# Patient Record
Sex: FEMALE | Race: WHITE | Employment: FULL TIME | URBAN - METROPOLITAN AREA
[De-identification: names, ages, dates, MRNs, and addresses within clinical notes are randomized per-mention and may not be internally consistent; named-entity substitution may affect disease eponyms.]

---

## 2019-06-04 ENCOUNTER — OFFICE VISIT (OUTPATIENT)
Dept: FAMILY MEDICINE CLINIC | Age: 34
End: 2019-06-04

## 2019-06-04 ENCOUNTER — HOSPITAL ENCOUNTER (OUTPATIENT)
Dept: LAB | Age: 34
Discharge: HOME | End: 2019-06-04
Payer: COMMERCIAL

## 2019-06-04 VITALS
RESPIRATION RATE: 16 BRPM | HEART RATE: 88 BPM | DIASTOLIC BLOOD PRESSURE: 65 MMHG | BODY MASS INDEX: 44.41 KG/M2 | HEIGHT: 68 IN | OXYGEN SATURATION: 97 % | SYSTOLIC BLOOD PRESSURE: 106 MMHG | WEIGHT: 293 LBS | TEMPERATURE: 98.4 F

## 2019-06-04 DIAGNOSIS — M72.2 PLANTAR FASCIITIS OF RIGHT FOOT: Primary | ICD-10-CM

## 2019-06-04 DIAGNOSIS — E66.01 MORBID OBESITY (HCC): ICD-10-CM

## 2019-06-04 LAB
ALBUMIN SERPL-MCNC: 4.3 G/DL (ref 3.4–5)
ALBUMIN/GLOB SERPL: 1.4 {RATIO} (ref 0.8–1.7)
ALP SERPL-CCNC: 74 U/L (ref 45–117)
ALT SERPL-CCNC: 38 U/L (ref 13–56)
ANION GAP SERPL CALC-SCNC: 7 MMOL/L (ref 3–18)
APPEARANCE UR: CLEAR
AST SERPL-CCNC: 19 U/L (ref 15–37)
BACTERIA URNS QL MICRO: ABNORMAL /HPF
BASOPHILS # BLD: 0 K/UL (ref 0–0.1)
BASOPHILS NFR BLD: 0 % (ref 0–2)
BILIRUB SERPL-MCNC: 0.5 MG/DL (ref 0.2–1)
BILIRUB UR QL: NEGATIVE
BUN SERPL-MCNC: 11 MG/DL (ref 7–18)
BUN/CREAT SERPL: 14 (ref 12–20)
CALCIUM SERPL-MCNC: 9.1 MG/DL (ref 8.5–10.1)
CHLORIDE SERPL-SCNC: 102 MMOL/L (ref 100–108)
CHOLEST SERPL-MCNC: 165 MG/DL
CO2 SERPL-SCNC: 28 MMOL/L (ref 21–32)
COLOR UR: YELLOW
CREAT SERPL-MCNC: 0.76 MG/DL (ref 0.6–1.3)
DIFFERENTIAL METHOD BLD: NORMAL
EOSINOPHIL # BLD: 0.1 K/UL (ref 0–0.4)
EOSINOPHIL NFR BLD: 1 % (ref 0–5)
EPITH CASTS URNS QL MICRO: ABNORMAL /LPF (ref 0–5)
ERYTHROCYTE [DISTWIDTH] IN BLOOD BY AUTOMATED COUNT: 12.8 % (ref 11.6–14.5)
EST. AVERAGE GLUCOSE BLD GHB EST-MCNC: 120 MG/DL
GLOBULIN SER CALC-MCNC: 3.1 G/DL (ref 2–4)
GLUCOSE SERPL-MCNC: 86 MG/DL (ref 74–99)
GLUCOSE UR STRIP.AUTO-MCNC: NEGATIVE MG/DL
HBA1C MFR BLD: 5.8 % (ref 4.2–5.6)
HCT VFR BLD AUTO: 39.3 % (ref 35–45)
HDLC SERPL-MCNC: 46 MG/DL (ref 40–60)
HDLC SERPL: 3.6 {RATIO} (ref 0–5)
HGB BLD-MCNC: 12.9 G/DL (ref 12–16)
HGB UR QL STRIP: NEGATIVE
KETONES UR QL STRIP.AUTO: NEGATIVE MG/DL
LDLC SERPL CALC-MCNC: 85.2 MG/DL (ref 0–100)
LEUKOCYTE ESTERASE UR QL STRIP.AUTO: ABNORMAL
LIPID PROFILE,FLP: ABNORMAL
LYMPHOCYTES # BLD: 1.9 K/UL (ref 0.9–3.6)
LYMPHOCYTES NFR BLD: 28 % (ref 21–52)
MCH RBC QN AUTO: 30.2 PG (ref 24–34)
MCHC RBC AUTO-ENTMCNC: 32.8 G/DL (ref 31–37)
MCV RBC AUTO: 92 FL (ref 74–97)
MONOCYTES # BLD: 0.5 K/UL (ref 0.05–1.2)
MONOCYTES NFR BLD: 7 % (ref 3–10)
NEUTS SEG # BLD: 4.4 K/UL (ref 1.8–8)
NEUTS SEG NFR BLD: 64 % (ref 40–73)
NITRITE UR QL STRIP.AUTO: NEGATIVE
PH UR STRIP: 7 [PH] (ref 5–8)
PLATELET # BLD AUTO: 235 K/UL (ref 135–420)
PMV BLD AUTO: 10.8 FL (ref 9.2–11.8)
POTASSIUM SERPL-SCNC: 4 MMOL/L (ref 3.5–5.5)
PROT SERPL-MCNC: 7.4 G/DL (ref 6.4–8.2)
PROT UR STRIP-MCNC: NEGATIVE MG/DL
RBC # BLD AUTO: 4.27 M/UL (ref 4.2–5.3)
RBC #/AREA URNS HPF: 0 /HPF (ref 0–5)
SODIUM SERPL-SCNC: 137 MMOL/L (ref 136–145)
SP GR UR REFRACTOMETRY: 1.01 (ref 1–1.03)
TRIGL SERPL-MCNC: 169 MG/DL (ref ?–150)
TSH SERPL DL<=0.05 MIU/L-ACNC: 1.76 UIU/ML (ref 0.36–3.74)
UROBILINOGEN UR QL STRIP.AUTO: 0.2 EU/DL (ref 0.2–1)
VLDLC SERPL CALC-MCNC: 33.8 MG/DL
WBC # BLD AUTO: 6.9 K/UL (ref 4.6–13.2)
WBC URNS QL MICRO: ABNORMAL /HPF (ref 0–4)

## 2019-06-04 PROCEDURE — 84443 ASSAY THYROID STIM HORMONE: CPT

## 2019-06-04 PROCEDURE — 81001 URINALYSIS AUTO W/SCOPE: CPT

## 2019-06-04 PROCEDURE — 80061 LIPID PANEL: CPT

## 2019-06-04 PROCEDURE — 36415 COLL VENOUS BLD VENIPUNCTURE: CPT

## 2019-06-04 PROCEDURE — 85025 COMPLETE CBC W/AUTO DIFF WBC: CPT

## 2019-06-04 PROCEDURE — 83036 HEMOGLOBIN GLYCOSYLATED A1C: CPT

## 2019-06-04 PROCEDURE — 80053 COMPREHEN METABOLIC PANEL: CPT

## 2019-06-04 NOTE — PROGRESS NOTES
History of Present Illness  Bob Rock is a 29 y.o. female who presents today for management of    Chief Complaint   Patient presents with   [facility]    Foot Pain     right       Patient is here to establish care. Previous PCP: Dr. Jese Millan pain  Patient complains of left foot pain, localized to the heel. Onset of the symptoms was 3 months ago. Inciting event: none known. Current symptoms include ability to bear weight, but with some pain. Aggravating symptoms: first step in the morning. Patient's course of pain: gradually improving. Patient has had no prior foot problems. Previous visits for this problem: none. Evaluation to date: none. Treatment to date: OTC analgesics: somewhat effective. She and your  have been trying to get pregnant for one year. Past Medical History  Past Medical History:   Diagnosis Date    Obesity (BMI 35.0-39.9 without comorbidity)         Surgical History  Past Surgical History:   Procedure Laterality Date    HX TONSILLECTOMY          Current Medications  Current Outpatient Medications   Medication Sig    PNV UC.87/MBRZXXI fum/folic ac (PRENATAL PO) Take  by mouth. No current facility-administered medications for this visit.         Allergies/Drug Reactions  Allergies   Allergen Reactions    Augmentin [Amoxicillin-Pot Clavulanate] Nausea and Vomiting        Family History  Family History   Problem Relation Age of Onset    Thyroid Disease Mother     Arthritis-osteo Mother     Obesity Mother     Sleep Apnea Mother     Heart Attack Mother 36    Hypertension Father     Thyroid Disease Father     No Known Problems Brother         Social History  Social History     Socioeconomic History    Marital status:      Spouse name: Not on file    Number of children: Not on file    Years of education: Not on file    Highest education level: Not on file   Occupational History    Not on file   Social Needs    Financial resource strain: Not on file    Food insecurity:     Worry: Not on file     Inability: Not on file    Transportation needs:     Medical: Not on file     Non-medical: Not on file   Tobacco Use    Smoking status: Never Smoker    Smokeless tobacco: Never Used   Substance and Sexual Activity    Alcohol use:  Yes     Alcohol/week: 2.4 oz     Types: 4 Glasses of wine per week     Comment: socially    Drug use: No    Sexual activity: Yes     Partners: Male     Birth control/protection: None   Lifestyle    Physical activity:     Days per week: Not on file     Minutes per session: Not on file    Stress: Not on file   Relationships    Social connections:     Talks on phone: Not on file     Gets together: Not on file     Attends Buddhist service: Not on file     Active member of club or organization: Not on file     Attends meetings of clubs or organizations: Not on file     Relationship status: Not on file    Intimate partner violence:     Fear of current or ex partner: Not on file     Emotionally abused: Not on file     Physically abused: Not on file     Forced sexual activity: Not on file   Other Topics Concern    Not on file   Social History Narrative    Not on file       Health Maintenance   Topic Date Due    DTaP/Tdap/Td series (1 - Tdap) 10/11/2007    Influenza Age 5 to Adult  08/01/2019    PAP AKA CERVICAL CYTOLOGY  01/01/2022    Pneumococcal 0-64 years  Aged Out       Review of Systems  Negative except as mentioned in HPI    Physical Exam  Vital signs:   Vitals:    06/04/19 1238   BP: 106/65   Pulse: 88   Resp: 16   Temp: 98.4 °F (36.9 °C)   TempSrc: Oral   SpO2: 97%   Weight: 313 lb (142 kg)   Height: 5' 8\" (1.727 m)       General: alert, oriented, not in distress  Head: scalp normal, atraumatic  Eyes: pupils are equal and reactive, full and intact EOM's  Neck: supple, no JVD, no lymphadenopathy, non-palpable thyroid  Chest/Lungs: clear breath sounds, no wheezing or crackles  Heart: normal rate, regular rhythm, no murmur  Extremities: no focal deformities, no edema  Foot exam: no open cuts, ulcers or wounds. No edema or TTP. Skin: no active skin lesions      Laboratory/Tests:  Component      Latest Ref Rng & Units 1/25/2016 1/25/2016 1/25/2016 1/25/2016           3:24 PM  3:24 PM  3:24 PM  3:24 PM   WBC      4.6 - 13.2 K/uL    5.3   RBC      4.20 - 5.30 M/uL    4.29   HGB      12.0 - 16.0 g/dL    13.1   HCT      35.0 - 45.0 %    39.6   MCV      74.0 - 97.0 FL    92.3   MCH      24.0 - 34.0 PG    30.5   MCHC      31.0 - 37.0 g/dL    33.1   RDW      11.6 - 14.5 %    12.0   PLATELET      898 - 691 K/uL    243   MPV      9.2 - 11.8 FL    11.3   NEUTROPHILS      40 - 73 %    54   LYMPHOCYTES      21 - 52 %    39   MONOCYTES      3 - 10 %    6   EOSINOPHILS      0 - 5 %    1   BASOPHILS      0 - 2 %    0   ABS. NEUTROPHILS      1.8 - 8.0 K/UL    2.9   ABS. LYMPHOCYTES      0.9 - 3.6 K/UL    2.1   ABS. MONOCYTES      0.05 - 1.2 K/UL    0.3   ABS. EOSINOPHILS      0.0 - 0.4 K/UL    0.1   ABS. BASOPHILS      0.0 - 0.06 K/UL    0.0   DF          AUTOMATED   Sodium      136 - 145 mmol/L   136    Potassium      3.5 - 5.5 mmol/L   4.2    Chloride      100 - 108 mmol/L   103    CO2      21 - 32 mmol/L   24    Anion gap      3.0 - 18 mmol/L   9    Glucose      74 - 99 mg/dL   87    BUN      7.0 - 18 MG/DL   14    Creatinine      0.6 - 1.3 MG/DL   0.79    BUN/Creatinine ratio      12 - 20     18    GFR est AA      >60 ml/min/1.73m2   >60    GFR est non-AA      >60 ml/min/1.73m2   >60    Calcium      8.5 - 10.1 MG/DL   8.8    Bilirubin, total      0.2 - 1.0 MG/DL   0.4    ALT (SGPT)      13 - 56 U/L   26    AST      15 - 37 U/L   18    Alk.  phosphatase      45 - 117 U/L   66    Protein, total      6.4 - 8.2 g/dL   7.7    Albumin      3.4 - 5.0 g/dL   4.1    Globulin      2.0 - 4.0 g/dL   3.6    A-G Ratio      0.8 - 1.7     1.1    Cholesterol, total      <200 MG/DL  174     Triglyceride      <150 MG/DL  77     HDL Cholesterol      40 - 60 MG/DL  66 (H)     LDL, calculated      0 - 100 MG/DL  92.6     VLDL, calculated      MG/DL  15.4     CHOL/HDL Ratio      0 - 5.0    2.6     Hemoglobin A1c, (calculated)      4.2 - 6.3 % 5.2      Est. average glucose      mg/dL 103        Component      Latest Ref Rng & Units 1/25/2016 1/25/2016           3:24 PM  3:24 PM   TSH      0.36 - 3.74 uIU/mL 0.99    T4, Free      0.7 - 1.5 NG/DL  1.2       Assessment/Plan:        ICD-10-CM ICD-9-CM    1. Plantar fasciitis of right foot M72.2 728.71    2. Morbid obesity ([facility] [address] E66.01 278.01 CBC WITH AUTOMATED DIFF      HEMOGLOBIN A1C WITH EAG      LIPID PANEL      METABOLIC PANEL, COMPREHENSIVE      TSH 3RD GENERATION      URINALYSIS W/ RFLX MICROSCOPIC     NSAID as needed  Home exercises for plantar fascitis  Weight loss      Follow-up and Dispositions    · Return if symptoms worsen or fail to improve. I have discussed the diagnosis with the patient and the intended plan as seen in the above orders. The patient has received an after-visit summary and questions were answered concerning future plans. I have discussed medication side effects and warnings with the patient as well. I have reviewed the plan of care with the patient, accepted their input and they are in agreement with the treatment goals.        Daly Everett MD  June 4, 2019

## 2019-06-04 NOTE — PATIENT INSTRUCTIONS
Plantar Fasciitis: Exercises  Your Care Instructions  Here are some examples of typical rehabilitation exercises for your condition. Start each exercise slowly. Ease off the exercise if you start to have pain. Your doctor or physical therapist will tell you when you can start these exercises and which ones will work best for you. How to do the exercises  Towel stretch    1. Sit with your legs extended and knees straight. 2. Place a towel around your foot just under the toes. 3. Hold each end of the towel in each hand, with your hands above your knees. 4. Pull back with the towel so that your foot stretches toward you. 5. Hold the position for at least 15 to 30 seconds. 6. Repeat 2 to 4 times a session, up to 5 sessions a day. Calf stretch    1. Stand facing a wall with your hands on the wall at about eye level. Put the leg you want to stretch about a step behind your other leg. 2. Keeping your back heel on the floor, bend your front knee until you feel a stretch in the back leg. 3. Hold the stretch for 15 to 30 seconds. Repeat 2 to 4 times. Plantar fascia and calf stretch    1. Stand on a step as shown above. Be sure to hold on to the banister. 2. Slowly let your heels down over the edge of the step as you relax your calf muscles. You should feel a gentle stretch across the bottom of your foot and up the back of your leg to your knee. 3. Hold the stretch about 15 to 30 seconds, and then tighten your calf muscle a little to bring your heel back up to the level of the step. Repeat 2 to 4 times. Towel curls    1. While sitting, place your foot on a towel on the floor and scrunch the towel toward you with your toes. 2. Then, also using your toes, push the towel away from you. Beltrami pickups    1. Put marbles on the floor next to a cup.  2. Using your toes, try to lift the marbles up from the floor and put them in the cup. Follow-up care is a key part of your treatment and safety.  Be sure to make and go to all appointments, and call your doctor if you are having problems. It's also a good idea to know your test results and keep a list of the medicines you take. Where can you learn more? Go to http://mario-dalton.info/. Zayra Carter in the search box to learn more about \"Plantar Fasciitis: Exercises. \"  Current as of: September 20, 2018  Content Version: 11.9  © 9384-9990 [employer]. Care instructions adapted under license by [employer] (which disclaims liability or warranty for this information). If you have questions about a medical condition or this instruction, always ask your healthcare professional. [address] any warranty or liability for your use of this information.

## 2019-06-05 PROBLEM — R73.03 PREDIABETES: Status: ACTIVE | Noted: 2019-06-05

## 2019-10-02 ENCOUNTER — CLINICAL SUPPORT (OUTPATIENT)
Dept: FAMILY MEDICINE CLINIC | Age: 34
End: 2019-10-02

## 2019-10-02 DIAGNOSIS — Z23 ENCOUNTER FOR IMMUNIZATION: Primary | ICD-10-CM

## 2019-10-02 DIAGNOSIS — Z02.89 EXAMINATION, PHYSICAL, EMPLOYEE: ICD-10-CM

## 2019-10-04 ENCOUNTER — CLINICAL SUPPORT (OUTPATIENT)
Dept: FAMILY MEDICINE CLINIC | Age: 34
End: 2019-10-04

## 2019-10-04 DIAGNOSIS — Z11.1 ENCOUNTER FOR PPD SKIN TEST READING: Primary | ICD-10-CM

## 2019-10-04 LAB
MM INDURATION POC: 0 MM (ref 0–5)
PPD POC: NEGATIVE NEGATIVE

## 2019-10-11 NOTE — PROGRESS NOTES
PPD Placement note  Yung Ridley, 36 y.o. 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 Yes  Is patient taking any oral or IV steroid medication now or have they taken it in the last month? no  Has the patient ever received the BCG vaccine?: no  Has the patient been in recent contact with anyone known or suspected of having active TB disease?: no       Date of exposure (if applicable): na       Name of person they were exposed to (if applicable): na  Patient's Country of origin?: [place]  O: Alert and oriented in NAD. P:  PPD placed on 10/02/2019. Patient advised to return for reading within 48-72 hours. Tuberculin skin test applied to left ventral forearm. Explained how to read the test, measuring induration not just erythema; she will come into office if test appears positive.

## 2019-11-13 ENCOUNTER — HOSPITAL ENCOUNTER (EMERGENCY)
Age: 34
Discharge: HOME | End: 2019-11-13
Attending: EMERGENCY MEDICINE
Payer: COMMERCIAL

## 2019-11-13 ENCOUNTER — APPOINTMENT (OUTPATIENT)
Dept: GENERAL RADIOLOGY | Age: 34
End: 2019-11-13
Attending: EMERGENCY MEDICINE
Payer: COMMERCIAL

## 2019-11-13 VITALS
DIASTOLIC BLOOD PRESSURE: 93 MMHG | HEIGHT: 68 IN | WEIGHT: 293 LBS | RESPIRATION RATE: 11 BRPM | BODY MASS INDEX: 44.41 KG/M2 | TEMPERATURE: 98.3 F | OXYGEN SATURATION: 98 % | HEART RATE: 90 BPM | SYSTOLIC BLOOD PRESSURE: 135 MMHG

## 2019-11-13 DIAGNOSIS — R07.89 ATYPICAL CHEST PAIN: Primary | ICD-10-CM

## 2019-11-13 LAB
ANION GAP SERPL CALC-SCNC: 5 MMOL/L (ref 3–18)
ATRIAL RATE: 75 BPM
BASOPHILS # BLD: 0 K/UL (ref 0–0.1)
BASOPHILS NFR BLD: 0 % (ref 0–2)
BUN SERPL-MCNC: 13 MG/DL (ref 7–18)
BUN/CREAT SERPL: 13 (ref 12–20)
CALCIUM SERPL-MCNC: 9.7 MG/DL (ref 8.5–10.1)
CALCULATED P AXIS, ECG09: 33 DEGREES
CALCULATED R AXIS, ECG10: 20 DEGREES
CALCULATED T AXIS, ECG11: 18 DEGREES
CHLORIDE SERPL-SCNC: 106 MMOL/L (ref 100–111)
CO2 SERPL-SCNC: 27 MMOL/L (ref 21–32)
CREAT SERPL-MCNC: 1.03 MG/DL (ref 0.6–1.3)
D DIMER PPP FEU-MCNC: 0.51 UG/ML(FEU)
DIAGNOSIS, 93000: NORMAL
DIFFERENTIAL METHOD BLD: NORMAL
EOSINOPHIL # BLD: 0.1 K/UL (ref 0–0.4)
EOSINOPHIL NFR BLD: 2 % (ref 0–5)
ERYTHROCYTE [DISTWIDTH] IN BLOOD BY AUTOMATED COUNT: 12.2 % (ref 11.6–14.5)
GLUCOSE SERPL-MCNC: 100 MG/DL (ref 74–99)
HCG UR QL: NEGATIVE
HCT VFR BLD AUTO: 40.7 % (ref 35–45)
HGB BLD-MCNC: 13.8 G/DL (ref 12–16)
LYMPHOCYTES # BLD: 2.2 K/UL (ref 0.9–3.6)
LYMPHOCYTES NFR BLD: 30 % (ref 21–52)
MCH RBC QN AUTO: 30.7 PG (ref 24–34)
MCHC RBC AUTO-ENTMCNC: 33.9 G/DL (ref 31–37)
MCV RBC AUTO: 90.6 FL (ref 74–97)
MONOCYTES # BLD: 0.4 K/UL (ref 0.05–1.2)
MONOCYTES NFR BLD: 5 % (ref 3–10)
NEUTS SEG # BLD: 4.6 K/UL (ref 1.8–8)
NEUTS SEG NFR BLD: 63 % (ref 40–73)
P-R INTERVAL, ECG05: 116 MS
PLATELET # BLD AUTO: 257 K/UL (ref 135–420)
PMV BLD AUTO: 10.6 FL (ref 9.2–11.8)
POTASSIUM SERPL-SCNC: 3.9 MMOL/L (ref 3.5–5.5)
Q-T INTERVAL, ECG07: 374 MS
QRS DURATION, ECG06: 92 MS
QTC CALCULATION (BEZET), ECG08: 417 MS
RBC # BLD AUTO: 4.49 M/UL (ref 4.2–5.3)
SODIUM SERPL-SCNC: 138 MMOL/L (ref 136–145)
TROPONIN I SERPL-MCNC: <0.02 NG/ML (ref 0–0.04)
VENTRICULAR RATE, ECG03: 75 BPM
WBC # BLD AUTO: 7.4 K/UL (ref 4.6–13.2)

## 2019-11-13 PROCEDURE — 85379 FIBRIN DEGRADATION QUANT: CPT

## 2019-11-13 PROCEDURE — 93005 ELECTROCARDIOGRAM TRACING: CPT

## 2019-11-13 PROCEDURE — 81025 URINE PREGNANCY TEST: CPT

## 2019-11-13 PROCEDURE — 99285 EMERGENCY DEPT VISIT HI MDM: CPT

## 2019-11-13 PROCEDURE — 85025 COMPLETE CBC W/AUTO DIFF WBC: CPT

## 2019-11-13 PROCEDURE — 71046 X-RAY EXAM CHEST 2 VIEWS: CPT

## 2019-11-13 PROCEDURE — 84484 ASSAY OF TROPONIN QUANT: CPT

## 2019-11-13 PROCEDURE — 80048 BASIC METABOLIC PNL TOTAL CA: CPT

## 2019-11-13 RX ORDER — KETOROLAC TROMETHAMINE 30 MG/ML
15 INJECTION, SOLUTION INTRAMUSCULAR; INTRAVENOUS
Status: DISCONTINUED | OUTPATIENT
Start: 2019-11-13 | End: 2019-11-13 | Stop reason: HOSPADM

## 2019-11-13 NOTE — ED TRIAGE NOTES
Patient reports sudden onset midsternal chest pain a few hours ago as well as pain in her upper abdomen. No cardiac hx. Denies any N+V, diaphoresis with the pain.  Patient reports it intermittently radiates into her back

## 2020-11-16 ENCOUNTER — HOSPITAL ENCOUNTER (EMERGENCY)
Age: 35
Discharge: HOME | End: 2020-11-16
Attending: EMERGENCY MEDICINE
Payer: COMMERCIAL

## 2020-11-16 ENCOUNTER — APPOINTMENT (OUTPATIENT)
Dept: ULTRASOUND IMAGING | Age: 35
End: 2020-11-16
Attending: EMERGENCY MEDICINE
Payer: COMMERCIAL

## 2020-11-16 VITALS
HEIGHT: 68 IN | SYSTOLIC BLOOD PRESSURE: 142 MMHG | BODY MASS INDEX: 44.41 KG/M2 | RESPIRATION RATE: 20 BRPM | TEMPERATURE: 97.7 F | WEIGHT: 293 LBS | DIASTOLIC BLOOD PRESSURE: 94 MMHG | HEART RATE: 78 BPM | OXYGEN SATURATION: 100 %

## 2020-11-16 DIAGNOSIS — O20.0 THREATENED MISCARRIAGE IN EARLY PREGNANCY: Primary | ICD-10-CM

## 2020-11-16 LAB
ABO + RH BLD: NORMAL
ALBUMIN SERPL-MCNC: 3.7 G/DL (ref 3.4–5)
ALBUMIN/GLOB SERPL: 0.9 {RATIO} (ref 0.8–1.7)
ALP SERPL-CCNC: 74 U/L (ref 45–117)
ALT SERPL-CCNC: 31 U/L (ref 13–56)
ANION GAP SERPL CALC-SCNC: 6 MMOL/L (ref 3–18)
APPEARANCE UR: CLEAR
AST SERPL-CCNC: 16 U/L (ref 10–38)
BACTERIA URNS QL MICRO: ABNORMAL /HPF
BASOPHILS # BLD: 0 K/UL (ref 0–0.1)
BASOPHILS NFR BLD: 0 % (ref 0–2)
BILIRUB SERPL-MCNC: 0.4 MG/DL (ref 0.2–1)
BILIRUB UR QL: NEGATIVE
BLOOD GROUP ANTIBODIES SERPL: NORMAL
BUN SERPL-MCNC: 7 MG/DL (ref 7–18)
BUN/CREAT SERPL: 11 (ref 12–20)
CALCIUM SERPL-MCNC: 9.1 MG/DL (ref 8.5–10.1)
CHLORIDE SERPL-SCNC: 107 MMOL/L (ref 100–111)
CO2 SERPL-SCNC: 25 MMOL/L (ref 21–32)
COLOR UR: YELLOW
CREAT SERPL-MCNC: 0.64 MG/DL (ref 0.6–1.3)
DIFFERENTIAL METHOD BLD: NORMAL
EOSINOPHIL # BLD: 0.1 K/UL (ref 0–0.4)
EOSINOPHIL NFR BLD: 1 % (ref 0–5)
EPITH CASTS URNS QL MICRO: ABNORMAL /LPF (ref 0–5)
ERYTHROCYTE [DISTWIDTH] IN BLOOD BY AUTOMATED COUNT: 12.1 % (ref 11.6–14.5)
GLOBULIN SER CALC-MCNC: 3.9 G/DL (ref 2–4)
GLUCOSE SERPL-MCNC: 87 MG/DL (ref 74–99)
GLUCOSE UR STRIP.AUTO-MCNC: NEGATIVE MG/DL
HCG SERPL-ACNC: 73 MIU/ML (ref 0–10)
HCT VFR BLD AUTO: 40.2 % (ref 35–45)
HGB BLD-MCNC: 13.1 G/DL (ref 12–16)
HGB UR QL STRIP: ABNORMAL
KETONES UR QL STRIP.AUTO: NEGATIVE MG/DL
LEUKOCYTE ESTERASE UR QL STRIP.AUTO: NEGATIVE
LIPASE SERPL-CCNC: 111 U/L (ref 73–393)
LYMPHOCYTES # BLD: 1.9 K/UL (ref 0.9–3.6)
LYMPHOCYTES NFR BLD: 30 % (ref 21–52)
MCH RBC QN AUTO: 30.3 PG (ref 24–34)
MCHC RBC AUTO-ENTMCNC: 32.6 G/DL (ref 31–37)
MCV RBC AUTO: 93.1 FL (ref 74–97)
MONOCYTES # BLD: 0.4 K/UL (ref 0.05–1.2)
MONOCYTES NFR BLD: 6 % (ref 3–10)
NEUTS SEG # BLD: 4 K/UL (ref 1.8–8)
NEUTS SEG NFR BLD: 63 % (ref 40–73)
NITRITE UR QL STRIP.AUTO: NEGATIVE
PH UR STRIP: 6.5 [PH] (ref 5–8)
PLATELET # BLD AUTO: 233 K/UL (ref 135–420)
PMV BLD AUTO: 10.4 FL (ref 9.2–11.8)
POTASSIUM SERPL-SCNC: 3.7 MMOL/L (ref 3.5–5.5)
PROT SERPL-MCNC: 7.6 G/DL (ref 6.4–8.2)
PROT UR STRIP-MCNC: ABNORMAL MG/DL
RBC # BLD AUTO: 4.32 M/UL (ref 4.2–5.3)
RBC #/AREA URNS HPF: ABNORMAL /HPF (ref 0–5)
SERVICE CMNT-IMP: NORMAL
SODIUM SERPL-SCNC: 138 MMOL/L (ref 136–145)
SP GR UR REFRACTOMETRY: <1.005 (ref 1–1.03)
SPECIMEN EXP DATE BLD: NORMAL
UROBILINOGEN UR QL STRIP.AUTO: 0.2 EU/DL (ref 0.2–1)
WBC # BLD AUTO: 6.3 K/UL (ref 4.6–13.2)
WBC URNS QL MICRO: ABNORMAL /HPF (ref 0–4)
WET PREP GENITAL: NORMAL

## 2020-11-16 PROCEDURE — 81001 URINALYSIS AUTO W/SCOPE: CPT

## 2020-11-16 PROCEDURE — 99284 EMERGENCY DEPT VISIT MOD MDM: CPT

## 2020-11-16 PROCEDURE — 85025 COMPLETE CBC W/AUTO DIFF WBC: CPT

## 2020-11-16 PROCEDURE — 76817 TRANSVAGINAL US OBSTETRIC: CPT

## 2020-11-16 PROCEDURE — 74011250636 HC RX REV CODE- 250/636: Performed by: EMERGENCY MEDICINE

## 2020-11-16 PROCEDURE — 87210 SMEAR WET MOUNT SALINE/INK: CPT

## 2020-11-16 PROCEDURE — 96372 THER/PROPH/DIAG INJ SC/IM: CPT

## 2020-11-16 PROCEDURE — 80053 COMPREHEN METABOLIC PANEL: CPT

## 2020-11-16 PROCEDURE — 86900 BLOOD TYPING SEROLOGIC ABO: CPT

## 2020-11-16 PROCEDURE — 84702 CHORIONIC GONADOTROPIN TEST: CPT

## 2020-11-16 PROCEDURE — 83690 ASSAY OF LIPASE: CPT

## 2020-11-16 RX ADMIN — HUMAN RHO(D) IMMUNE GLOBULIN 0.3 MG: 300 INJECTION, SOLUTION INTRAMUSCULAR at 12:42

## 2020-11-16 NOTE — ED TRIAGE NOTES
5 weeks gestation with vaginal bleeding this morning with slight cramping, pt states spotting started on Friday evening. [id number], first pregnancy ended in a miscarraige at 5 weeks. Pt is type O negative.

## 2020-11-16 NOTE — DISCHARGE INSTRUCTIONS
Patient Education        Threatened Miscarriage: Care Instructions  Overview     Some women have light spotting or bleeding during the first 12 weeks of pregnancy. In some cases this is normal. Light spotting or bleeding can also be a sign of a possible loss of the pregnancy. This is called a threatened miscarriage. At this point, the doctor may not be able to tell if your vaginal bleeding is normal or is a sign of a miscarriage. In early pregnancy, things such as stress, exercise, and sex do not cause miscarriage. You may be worried or upset about the possibility of losing your pregnancy. But do not blame yourself. There is no treatment to stop a miscarriage. If you do have a miscarriage, there was nothing you could have done to prevent it. A miscarriage usually means that the pregnancy is not developing normally. Follow-up care is a key part of your treatment and safety. Be sure to make and go to all appointments, and call your doctor if you are having problems. It's also a good idea to know your test results and keep a list of the medicines you take. How can you care for yourself at home? · Take acetaminophen (Tylenol) for cramps. Read and follow all instructions on the label. · Do not take two or more pain medicines at the same time unless the doctor told you to. Many pain medicines have acetaminophen, which is Tylenol. Too much acetaminophen (Tylenol) can be harmful. · Do not have sex until your doctor says it is okay. · Get lots of rest over the next several days. · You may do your normal activities if you feel well enough to do them. But do not do any heavy exercise until your doctor says it is okay. · Eat a balanced diet that is high in iron and vitamin C. Foods rich in iron include red meat, shellfish, eggs, beans, and leafy green vegetables. Foods high in vitamin C include citrus fruits, tomatoes, and broccoli. Talk to your doctor about whether you need to take iron pills or a multivitamin.   · Do not drink alcohol or use tobacco or illegal drugs. · Do not smoke. If you need help quitting, talk to your doctor about stop-smoking programs and medicines. These can increase your chances of quitting for good. When should you call for help? Call 911 anytime you think you may need emergency care. For example, call if:    · You passed out (lost consciousness). Call your doctor now or seek immediate medical care if:    · You have severe vaginal bleeding.     · You are dizzy or lightheaded, or you feel like you may faint.     · You have new or worse pain in your belly or pelvis.     · You have a fever.     · You have vaginal discharge that smells bad. Watch closely for changes in your health, and be sure to contact your doctor if:    · You do not get better as expected. Where can you learn more? Go to http://www.gray.com/  Enter [id number] in the search box to learn more about \"Threatened Miscarriage: Care Instructions. \"  Current as of: February 11, 2020               Content Version: 12.6  © 8843-5702 [employer]. Care instructions adapted under license by [employer] (which disclaims liability or warranty for this information). If you have questions about a medical condition or this instruction, always ask your healthcare professional. [address] any warranty or liability for your use of this information.

## 2020-11-16 NOTE — ED PROVIDER NOTES
Patient is a 72-year-old G2, P0 female presenting at approximately 5 weeks of pregnancy with complaints of vaginal spotting and vaginal bleeding. Patient reports that spotting started over the weekend but this morning she began having more regular bleeding which she reports similar to normal period for her. She reports that she is about 5 weeks by dates, has not had ultrasound or other work-up so far during this pregnancy. She was seen by her OB last week for initial evaluation, her pregnancy was confirmed but has not had an ultrasound done yet so far. Reports that previous pregnancy resulted in a miscarriage at approximately 5 weeks. Patient denies any fevers or chills. Complains of mild abdominal cramping that has since resolved. She denies any history of ongoing medical problems that she is aware of. No vaginal discharge. Pregnancy Problem    Pertinent negatives include no fever, no diarrhea, no nausea, no vomiting, no dysuria, no frequency, no headaches, no chest pain and no back pain.         Past Medical History:   Diagnosis Date    Obesity (BMI 35.0-39.9 without comorbidity)        Past Surgical History:   Procedure Laterality Date    HX TONSILLECTOMY           Family History:   Problem Relation Age of Onset    Thyroid Disease Mother     Arthritis-osteo Mother     Obesity Mother     Sleep Apnea Mother     Heart Attack Mother 36    Hypertension Father     Thyroid Disease Father     No Known Problems Brother        Social History     Socioeconomic History    Marital status:      Spouse name: Not on file    Number of children: Not on file    Years of education: Not on file    Highest education level: Not on file   Occupational History    Not on file   Social Needs    Financial resource strain: Not on file    Food insecurity     Worry: Not on file     Inability: Not on file    Transportation needs     Medical: Not on file     Non-medical: Not on file   Tobacco Use    Smoking status: Never Smoker    Smokeless tobacco: Never Used   Substance and Sexual Activity    Alcohol use: Yes     Alcohol/week: 4.0 standard drinks     Types: 4 Glasses of wine per week     Comment: socially    Drug use: No    Sexual activity: Yes     Partners: Male     Birth control/protection: None   Lifestyle    Physical activity     Days per week: Not on file     Minutes per session: Not on file    Stress: Not on file   Relationships    Social connections     Talks on phone: Not on file     Gets together: Not on file     Attends L[employer]-day service: Not on file     Active member of club or organization: Not on file     Attends meetings of clubs or organizations: Not on file     Relationship status: Not on file    Intimate partner violence     Fear of current or ex partner: Not on file     Emotionally abused: Not on file     Physically abused: Not on file     Forced sexual activity: Not on file   Other Topics Concern    Not on file   Social History Narrative    Not on file         ALLERGIES: Augmentin [amoxicillin-pot clavulanate]    Review of Systems   Constitutional: Negative for chills and fever. HENT: Negative for congestion, rhinorrhea, sinus pressure and sneezing. Eyes: Negative for visual disturbance. Respiratory: Negative for cough and shortness of breath. Cardiovascular: Negative for chest pain. Gastrointestinal: Negative for abdominal pain, diarrhea, nausea and vomiting. Genitourinary: Positive for vaginal bleeding. Negative for dysuria, frequency, urgency and vaginal pain. Musculoskeletal: Negative for back pain and neck pain. Skin: Negative for rash. Neurological: Negative for syncope, numbness and headaches. Vitals:    11/16/20 0908   BP: (!) 142/94   Pulse: 78   Resp: 20   Temp: 97.7 °F (36.5 °C)   SpO2: 100%   Weight: 149.7 kg (330 lb)   Height: 5' 8\" (1.727 m)            Physical Exam  Constitutional:       General: She is not in acute distress.      Appearance: Normal appearance. She is normal weight. She is not ill-appearing or toxic-appearing. HENT:      Head: Normocephalic and atraumatic. Right Ear: External ear normal.      Left Ear: External ear normal.      Nose: Nose normal. No congestion or rhinorrhea. Mouth/Throat:      Mouth: Mucous membranes are moist.      Pharynx: Oropharynx is clear. No oropharyngeal exudate or posterior oropharyngeal erythema. Eyes:      Conjunctiva/sclera: Conjunctivae normal.      Pupils: Pupils are equal, round, and reactive to light. Neck:      Musculoskeletal: Normal range of motion and neck supple. No muscular tenderness. Cardiovascular:      Rate and Rhythm: Normal rate and regular rhythm. Pulses: Normal pulses. Heart sounds: Normal heart sounds. No murmur. Pulmonary:      Effort: Pulmonary effort is normal.      Breath sounds: Normal breath sounds. No wheezing, rhonchi or rales. Abdominal:      General: Abdomen is flat. Tenderness: There is no abdominal tenderness. There is no guarding or rebound. Genitourinary:     Comments: Cervical os is closed. Mild to moderate amount of dark blood within the vaginal vault. No cervical motion tenderness, no adnexal or uterine tenderness. No discharge. Musculoskeletal: Normal range of motion. General: No swelling, tenderness or deformity. Skin:     General: Skin is warm and dry. Capillary Refill: Capillary refill takes less than 2 seconds. Findings: No rash. Neurological:      General: No focal deficit present. Mental Status: She is alert.         Recent Results (from the past 12 hour(s))   URINALYSIS W/ RFLX MICROSCOPIC    Collection Time: 11/16/20  9:15 AM   Result Value Ref Range    Color YELLOW      Appearance CLEAR      Specific gravity <1.005 (L) 1.005 - 1.030    pH (UA) 6.5 5.0 - 8.0      Protein TRACE (A) NEG mg/dL    Glucose Negative NEG mg/dL    Ketone Negative NEG mg/dL    Bilirubin Negative NEG      Blood LARGE (A) NEG Urobilinogen 0.2 0.2 - 1.0 EU/dL    Nitrites Negative NEG      Leukocyte Esterase Negative NEG     URINE MICROSCOPIC ONLY    Collection Time: 11/16/20  9:15 AM   Result Value Ref Range    WBC 2 to 5 0 - 4 /hpf    RBC 11 to 20 0 - 5 /hpf    Epithelial cells 1+ 0 - 5 /lpf    Bacteria 1+ (A) NEG /hpf   CBC WITH AUTOMATED DIFF    Collection Time: 11/16/20  9:50 AM   Result Value Ref Range    WBC 6.3 4.6 - 13.2 K/uL    RBC 4.32 4.20 - 5.30 M/uL    HGB 13.1 12.0 - 16.0 g/dL    HCT 40.2 35.0 - 45.0 %    MCV 93.1 74.0 - 97.0 FL    MCH 30.3 24.0 - 34.0 PG    MCHC 32.6 31.0 - 37.0 g/dL    RDW 12.1 11.6 - 14.5 %    PLATELET 553 632 - 959 K/uL    MPV 10.4 9.2 - 11.8 FL    NEUTROPHILS 63 40 - 73 %    LYMPHOCYTES 30 21 - 52 %    MONOCYTES 6 3 - 10 %    EOSINOPHILS 1 0 - 5 %    BASOPHILS 0 0 - 2 %    ABS. NEUTROPHILS 4.0 1.8 - 8.0 K/UL    ABS. LYMPHOCYTES 1.9 0.9 - 3.6 K/UL    ABS. MONOCYTES 0.4 0.05 - 1.2 K/UL    ABS. EOSINOPHILS 0.1 0.0 - 0.4 K/UL    ABS. BASOPHILS 0.0 0.0 - 0.1 K/UL    DF AUTOMATED     TYPE & SCREEN    Collection Time: 11/16/20  9:50 AM   Result Value Ref Range    Crossmatch Expiration 11/19/2020,2359     ABO/Rh(D) O NEGATIVE     Antibody screen NEG    METABOLIC PANEL, COMPREHENSIVE    Collection Time: 11/16/20  9:50 AM   Result Value Ref Range    Sodium 138 136 - 145 mmol/L    Potassium 3.7 3.5 - 5.5 mmol/L    Chloride 107 100 - 111 mmol/L    CO2 25 21 - 32 mmol/L    Anion gap 6 3.0 - 18 mmol/L    Glucose 87 74 - 99 mg/dL    BUN 7 7.0 - 18 MG/DL    Creatinine 0.64 0.6 - 1.3 MG/DL    BUN/Creatinine ratio 11 (L) 12 - 20      GFR est AA >60 >60 ml/min/1.73m2    GFR est non-AA >60 >60 ml/min/1.73m2    Calcium 9.1 8.5 - 10.1 MG/DL    Bilirubin, total 0.4 0.2 - 1.0 MG/DL    ALT (SGPT) 31 13 - 56 U/L    AST (SGOT) 16 10 - 38 U/L    Alk.  phosphatase 74 45 - 117 U/L    Protein, total 7.6 6.4 - 8.2 g/dL    Albumin 3.7 3.4 - 5.0 g/dL    Globulin 3.9 2.0 - 4.0 g/dL    A-G Ratio 0.9 0.8 - 1.7     LIPASE    Collection Time: 20  9:50 AM   Result Value Ref Range    Lipase 111 73 - 393 U/L   BETA HCG, QT    Collection Time: 20  9:50 AM   Result Value Ref Range    Beta HCG, QT 73 (H) 0 - 10 MIU/ML   WET PREP    Collection Time: 20 10:35 AM    Specimen: Vagina   Result Value Ref Range    Special Requests: NO SPECIAL REQUESTS      Wet prep NO YEAST,TRICHOMONAS OR CLUE CELLS NOTED     RH IMMUNE GLOBULIN PROPHYLACTIC    Collection Time: 20 12:00 PM   Result Value Ref Range    No. of weeks gestation 5      CALLED TO: TOMASZ IN ER ON [id number] AT 7520 BY LD     Unit number [id number]     Blood component type RH IMMUNE GLOBULIN     Unit division 00     Status of unit ISSUED      US OB TV W DOPPLER   Final Result   IMPRESSION:      1. Pregnancy of unknown location. No identifiable intrauterine gestational sac. Dedicated clinical follow-up, trending of beta hCG levels, and repeat ultrasound   assessment to document viable each uterine pregnancy recommended. 2. Normal blood flow to each ovary. Likely left ovarian corpus luteum. 3. Small quantity pelvic fluid. MDM  Number of Diagnoses or Management Options  Threatened miscarriage in early pregnancy:   Diagnosis management comments: Patient is a 67-year-old female who presents to the emergency department today with a chief complaint of vaginal bleeding. Reports she is approximately 5 weeks pregnant by dates. No vaginal discharge or abdominal pain. On exam patient has no abdominal tenderness, cervical os appears to be closed with mild to moderate amount of blood within the vaginal vault. No retained tissue that I can appreciate on exam.    Differential includes threatened , inevitable , complete , ectopic pregnancy, septic , etc.    Patient's lab work-up demonstrates essentially normal hemoglobin, her quantitative hCG unfortunately is quite low which suggest a nonviable pregnancy.   An ultrasound was completed which did not show any evidence of a pregnancy. Although not able to completely exclude ectopic pregnancy I think the likelihood is low at this point, and there is no indication of this. Patient has good and close follow-up with her obstetrician. I discussed the results with the patient that this was likely a miscarriage however additional testing would be required in the outpatient setting including follow-up hCG versus repeat ultrasound or both. Patient expresses understanding of the results as well as the treatment plan. Advised patient return for severe pain, heavy bleeding, fevers or any other symptoms that concern her. Patient aware and expressed understanding. Patient will follow up with OB on a short-term basis and return at once if symptoms worsening. At this time, patient is stable and appropriate for discharge home.  Patient demonstrates understanding of current diagnoses and is in agreement with the treatment plan. Je Estradaon are advised that while the likelihood of serious underlying condition is low at this point given the evaluation performed today, we cannot fully rule it out. Je Alexis are advised to immediately return with any new symptoms or worsening of current condition.  All questions have been answered. Valerie Clemons is given educational material regarding their diagnoses, including danger symptoms and when to return to the ED. Procedures    Diagnosis:   1. Threatened miscarriage in early pregnancy          Disposition: Discharge Home    Follow-up Information     Follow up With Specialties Details Why Contact Info    [facility]  In 2 days Call this afternoon or tomorrow morning. You need to have a repeat hCG level or ultrasound performed to follow-up.  [address] EMERGENCY DEPT Emergency Medicine  As needed, If symptoms worsen [address]  [phone]          Discharge Medication List as of 11/16/2020 11:55 AM      CONTINUE these medications which have NOT CHANGED    Details   PNV KO.85/PFAPGAH fum/folic ac (PRENATAL PO) Take  by mouth., Historical Med

## 2020-11-17 LAB
BLD PROD TYP BPU: NORMAL
BPU ID: NORMAL
CALLED TO:,BCALL1: NORMAL
GA (WEEKS): 5 WK
STATUS OF UNIT,%ST: NORMAL
UNIT DIVISION, %UDIV: 0

## 2021-04-22 ENCOUNTER — OFFICE VISIT (OUTPATIENT)
Dept: FAMILY MEDICINE CLINIC | Age: 36
End: 2021-04-22
Payer: COMMERCIAL

## 2021-04-22 VITALS
WEIGHT: 293 LBS | OXYGEN SATURATION: 99 % | DIASTOLIC BLOOD PRESSURE: 84 MMHG | TEMPERATURE: 97.2 F | RESPIRATION RATE: 17 BRPM | HEIGHT: 68 IN | BODY MASS INDEX: 44.41 KG/M2 | HEART RATE: 93 BPM | SYSTOLIC BLOOD PRESSURE: 122 MMHG

## 2021-04-22 DIAGNOSIS — R10.9 ABDOMINAL CRAMPING: Primary | ICD-10-CM

## 2021-04-22 PROCEDURE — 99213 OFFICE O/P EST LOW 20 MIN: CPT | Performed by: INTERNAL MEDICINE

## 2021-04-22 RX ORDER — ASPIRIN 81 MG/1
TABLET ORAL DAILY
COMMUNITY
End: 2022-01-19

## 2021-04-22 RX ORDER — DICYCLOMINE HYDROCHLORIDE 10 MG/1
10 CAPSULE ORAL
Qty: 30 CAP | Refills: 0 | Status: SHIPPED | OUTPATIENT
Start: 2021-04-22 | End: 2021-06-08

## 2021-04-22 NOTE — PROGRESS NOTES
Carmen Felix presents today for abdominal pain x 1 week   - Is someone accompanying this pt? no  - Is the patient using any durable medical equipment during office visit? no    Coordination of Care:  1. Have you been to the ER, urgent care clinic since your last visit? Hospitalized since your last visit? no    2. Have you seen or consulted any other health care providers outside of the [address] [place] [address] since your last visit? Include any pap smears or colon screening.  GYN - negative for urine infection

## 2021-04-22 NOTE — PROGRESS NOTES
History of Present Illness  Russel Pacheco is a 30 y.o. female who presents today for management of    Chief Complaint   Patient presents with    Abdominal Pain     x 1 week       Abdominal Pain  Patient complains of abdominal pain. The pain is described as sharp, and is 10/10 in intensity. Pain is located in the lower abdomen without radiation. Onset was 1 week ago. It started at the onset of her menstrual bleeding. Symptoms have been gradually improving since. Aggravating factors: pain is present only during bowel movement. Alleviating factors: none. Associated symptoms: bloating. The patient denies anorexia, chills, constipation, diarrhea, dysuria, fever, nausea, urinary frequency and urinary urgency. Patient was seen by her GYN 3 days ago. UTI was ruled out. She is scheduled for a pelvic US. Her period has ended. Problem List  Patient Active Problem List    Diagnosis Date Noted    Prediabetes 06/05/2019    Morbid obesity ([facility] [address] 01/25/2016       Current Medications  Current Outpatient Medications   Medication Sig    aspirin delayed-release 81 mg tablet Take  by mouth daily.  dicyclomine (BENTYL) 10 mg capsule Take 1 Cap by mouth three (3) times daily as needed for Abdominal Cramps.  PNV ZO.69/YFIABAB fum/folic ac (PRENATAL PO) Take  by mouth. No current facility-administered medications for this visit. Allergies/Drug Reactions  Allergies   Allergen Reactions    Augmentin [Amoxicillin-Pot Clavulanate] Nausea and Vomiting        Review of Systems  Review of Systems   Constitutional: Negative. Respiratory: Negative. Cardiovascular: Negative. Gastrointestinal: Positive for abdominal pain. Negative for blood in stool, constipation, diarrhea, heartburn, melena, nausea and vomiting. Genitourinary: Negative. Musculoskeletal: Negative. Neurological: Negative.        Physical Exam  Vital signs:   Vitals:    04/22/21 1335   BP: 122/84   Pulse: 93   Resp: 17   Temp: 97.2 °F (36.2 °C)   TempSrc: Temporal   SpO2: 99%   Weight: 334 lb (151.5 kg)   Height: 5' 8\" (1.727 m)       General: alert, oriented, not in distress  Head: scalp normal, atraumatic  Eyes: pupils are equal and reactive, full and intact EOM's  Neck: supple, no JVD, no lymphadenopathy, non-palpable thyroid  Chest/Lungs: clear breath sounds, no wheezing or crackles  Heart: normal rate, regular rhythm, no murmur  Abdomen: soft, non-distended, non-tender, normal bowel sounds, no organomegaly, no masses  Extremities: no focal deformities, no edema  Skin: no active skin lesions      Assessment/Plan:      ICD-10-CM ICD-9-CM    1. Abdominal cramping  R10.9 789.00 dicyclomine (BENTYL) 10 mg capsule     DDx: IBS, endomteriosis, dysmenorrhea    Pain control  Trial of dicyclomine    Follow-up and Dispositions    · Return if symptoms worsen or fail to improve, for annual physical exam.           I have discussed the diagnosis with the patient and the intended plan as seen in the above orders. The patient has received an after-visit summary and questions were answered concerning future plans. I have discussed medication side effects and warnings with the patient as well. I have reviewed the plan of care with the patient, accepted their input and they are in agreement with the treatment goals.        Mary Sood MD  April 22, 2021

## 2021-06-08 ENCOUNTER — OFFICE VISIT (OUTPATIENT)
Dept: SURGERY | Age: 36
End: 2021-06-08
Payer: COMMERCIAL

## 2021-06-08 VITALS
BODY MASS INDEX: 44.41 KG/M2 | WEIGHT: 293 LBS | HEIGHT: 68 IN | SYSTOLIC BLOOD PRESSURE: 129 MMHG | RESPIRATION RATE: 20 BRPM | HEART RATE: 65 BPM | DIASTOLIC BLOOD PRESSURE: 78 MMHG | TEMPERATURE: 98.8 F

## 2021-06-08 DIAGNOSIS — E66.01 MORBID (SEVERE) OBESITY DUE TO EXCESS CALORIES (HCC): Primary | ICD-10-CM

## 2021-06-08 DIAGNOSIS — R73.09 ELEVATED HEMOGLOBIN A1C: ICD-10-CM

## 2021-06-08 DIAGNOSIS — E28.2 PCOS (POLYCYSTIC OVARIAN SYNDROME): ICD-10-CM

## 2021-06-08 DIAGNOSIS — R73.01 IFG (IMPAIRED FASTING GLUCOSE): ICD-10-CM

## 2021-06-08 DIAGNOSIS — K21.9 GASTROESOPHAGEAL REFLUX DISEASE, UNSPECIFIED WHETHER ESOPHAGITIS PRESENT: ICD-10-CM

## 2021-06-08 DIAGNOSIS — N39.3 SUI (STRESS URINARY INCONTINENCE, FEMALE): ICD-10-CM

## 2021-06-08 DIAGNOSIS — Z01.818 PRE-OP TESTING: ICD-10-CM

## 2021-06-08 DIAGNOSIS — R06.83 SNORING: ICD-10-CM

## 2021-06-08 PROCEDURE — 99204 OFFICE O/P NEW MOD 45 MIN: CPT | Performed by: NURSE PRACTITIONER

## 2021-06-08 NOTE — PROGRESS NOTES
Carolee Silveira is a 30 y.o. female who presents today with   Chief Complaint   Patient presents with    Morbid Obesity     Consult        Body mass index is 51.09 kg/m². 1. Have you been to the ER, urgent care clinic since your last visit? Hospitalized since your last visit? No    2. Have you seen or consulted any other health care providers outside of the [address] [place] [address] since your last visit? Include any pap smears or colon screening.  No

## 2021-06-08 NOTE — PROGRESS NOTES
Consultation for Bariatric Surgery  (Undecided)    Silvana Ceja is a 30 y.o. female who comes into the office today for initial consultation for the surgical options for the treatment of morbid obesity. The patient initially identified obesity at the age of middle school and at age 26 weighed 180lbs. Surpassed 220lbs around age 35. She has tried a variety of unsupervised weight-loss attempts including self imposed, Weight Watchers, family physician and Paleo, whole 30 , but has yet to meet with lasting success. Maximum weight lost on a diet is about 30 lbs, but that the weight loss always seems to return. Today, the patient is  Height: 5' 8\" (172.7 cm) tall, Weight: 152.4 kg (336 lb) lbs for a Body mass index is 51.09 kg/m². It is due to the patient's severe obesity, which is further complicated by GERD, stress urinary incontinence, weight related arthopathies and Pre DM   that the patient is now seeking out bariatric surgery, specifically, undecided. Past Medical History:   Diagnosis Date    Diabetes ([address]     Pre DM    Obesity (BMI 35.0-39.9 without comorbidity)     PCOS (polycystic ovarian syndrome)     Plantar fasciitis      Past Surgical History:   Procedure Laterality Date    HX GYN  2020    polypectomy uterus     HX TONSILLECTOMY       Current Outpatient Medications   Medication Sig Dispense Refill    aspirin delayed-release 81 mg tablet Take  by mouth daily. Allergies   Allergen Reactions    Augmentin [Amoxicillin-Pot Clavulanate] Nausea and Vomiting     Social History     Tobacco Use    Smoking status: Never Smoker    Smokeless tobacco: Never Used   Vaping Use    Vaping Use: Never used   Substance Use Topics    Alcohol use:  Yes     Alcohol/week: 1.0 standard drinks     Types: 1 Glasses of wine per week     Comment: socially    Drug use: No     Family History   Problem Relation Age of Onset    Thyroid Disease Mother     Arthritis-osteo Mother     Obesity Mother     Sleep Apnea Mother    Kylie Mendez Mother 36    Hypertension Father     Thyroid Disease Father     Sleep Apnea Brother     Obesity Brother        Family Status   Relation Name Status    Mother  Alive    Father  Alive    Brother  Alive       Review of Systems:  Positive in BOLD  CONST: Fever, weight loss, fatigue or chills  GI: Nausea, vomiting, abdominal pain, change in bowel habits, hematochezia, melena, and GERD   INTEG: Dermatitis, abnormal moles  HEENT: Recent changes in vision, vertigo, epistaxis, dysphagia and hoarseness  CV: Chest pain, palpitations, HTN, edema and varicosities  RESP: Cough, shortness of breath, wheezing, hemoptysis, snoring and reactive airway disease  : Hematuria, dysuria, frequency, urgency, nocturia and stress urinary incontinence   MS: Weakness, joint pain and arthritis  ENDO: Pre-Diabetes, thyroid disease, polyuria, polydipsia, polyphagia, poor wound healing, heat intolerance, cold intolerance  LYMPH/HEME: Anemia, bruising and history of blood transfusions  NEURO: Dizziness, headache, fainting, seizures and stroke  PSYCH: Anxiety and depression    Physical Exam    Visit Vitals  /78 (BP 1 Location: Left upper arm, BP Patient Position: At rest, BP Cuff Size: Adult)   Pulse 65   Temp 98.8 °F (37.1 °C) (Oral)   Resp 20   Ht 5' 8\" (1.727 m)   Wt 152.4 kg (336 lb)   BMI 51.09 kg/m²       Pre op weight: 336  EBW: 194  Goal Weight Calc Women: 180.8  Wt loss to date: 0       Physical Exam  Vitals and nursing note reviewed. Constitutional:       Appearance: She is obese. HENT:      Head: Normocephalic and atraumatic. Cardiovascular:      Rate and Rhythm: Normal rate. Heart sounds: Normal heart sounds. Pulmonary:      Effort: Pulmonary effort is normal.      Breath sounds: Normal breath sounds. Abdominal:      General: Bowel sounds are normal. There is no distension. Palpations: Abdomen is soft. There is no mass. Tenderness: There is no abdominal tenderness. Hernia: No hernia is present. Musculoskeletal:         General: Normal range of motion. Cervical back: Normal range of motion and neck supple. No tenderness. Right lower leg: Edema present. Left lower leg: Edema present. Skin:     General: Skin is warm and dry. Neurological:      General: No focal deficit present. Mental Status: She is alert and oriented to person, place, and time. Psychiatric:         Mood and Affect: Mood and affect normal.          Impression:    Aparna Petit is a 30 y.o. female who is suffering from morbid obesity with a BMI of 51.09  and comorbidities including GERD, stress urinary incontinence, weight related arthopathies and pre DM   who would benefit from bariatric surgery. We have had an extensive discussion with regard to the risks, benefits and likely outcomes of the operation. We've discussed the restrictive and malabsorptive nature of the gastric bypass and compared and contrasted with the sleeve gastrectomy. The patient understands the likelihood of losing approximately 80% of their excess weight in 12 to 18 months. She also understands the risks including but not limited to bleeding, infection, need for reoperation, ulcers, leaks and strictures, bowel obstruction secondary to adhesions and internal hernias, DVT, PE, heart attack, stroke, and death. Patient also understands risks of inadequate weight loss, excess weight loss, vitamin insufficiency, protein malnutrition, excess skin, and loss of hair. We have reviewed the components of a successful postoperative course including requirement for a high protein, low carbohydrate diet, 60 oz a day of zero calorie liquids, daily vitamin supplementation, daily exercise, regular follow-up, and participation in support groups.  At this time we will enroll the patient in our bariatric program, undertake routine laboratory evaluation, chest X-ray, EKG, possible UGI and evaluation by  nutritionist as well as psychologist and pending their satisfactory completion of the preop evaluation, plan to perform a undecided. The patient clearly understands that they have to complete the duration of WLT consecutively and if they miss a month restart will be required.  Additionally, no showing the RD appointments may result in removal from the surgical weight loss program.

## 2021-06-14 ENCOUNTER — HOSPITAL ENCOUNTER (OUTPATIENT)
Dept: GENERAL RADIOLOGY | Age: 36
Discharge: HOME | End: 2021-06-14
Attending: NURSE PRACTITIONER
Payer: COMMERCIAL

## 2021-06-14 DIAGNOSIS — Z01.818 PRE-OP TESTING: ICD-10-CM

## 2021-06-14 DIAGNOSIS — R73.01 IFG (IMPAIRED FASTING GLUCOSE): ICD-10-CM

## 2021-06-14 DIAGNOSIS — E66.01 MORBID (SEVERE) OBESITY DUE TO EXCESS CALORIES (HCC): ICD-10-CM

## 2021-06-14 DIAGNOSIS — R73.09 ELEVATED HEMOGLOBIN A1C: ICD-10-CM

## 2021-06-14 DIAGNOSIS — N39.3 SUI (STRESS URINARY INCONTINENCE, FEMALE): ICD-10-CM

## 2021-06-14 DIAGNOSIS — R06.83 SNORING: ICD-10-CM

## 2021-06-14 DIAGNOSIS — E28.2 PCOS (POLYCYSTIC OVARIAN SYNDROME): ICD-10-CM

## 2021-06-14 DIAGNOSIS — K21.9 GASTROESOPHAGEAL REFLUX DISEASE, UNSPECIFIED WHETHER ESOPHAGITIS PRESENT: ICD-10-CM

## 2021-06-14 PROCEDURE — 74246 X-RAY XM UPR GI TRC 2CNTRST: CPT

## 2021-06-14 PROCEDURE — 74011000250 HC RX REV CODE- 250: Performed by: NURSE PRACTITIONER

## 2021-06-14 RX ADMIN — BARIUM SULFATE 176 G: 960 POWDER, FOR SUSPENSION ORAL at 08:01

## 2021-06-14 RX ADMIN — ANTACID/ANTIFLATULENT 8 G: 380; 550; 10; 10 GRANULE, EFFERVESCENT ORAL at 08:01

## 2021-06-14 RX ADMIN — BARIUM SULFATE 135 ML: 980 POWDER, FOR SUSPENSION ORAL at 08:00

## 2021-06-17 ENCOUNTER — HOSPITAL ENCOUNTER (OUTPATIENT)
Dept: BARIATRICS/WEIGHT MGMT | Age: 36
Discharge: HOME | End: 2021-06-17

## 2021-06-17 NOTE — PROGRESS NOTES
[facility]  [id number] [place] [address], [address]    Patient's Name: Nicole Acevedo   Age: 30 y.o. YOB: 1985   Sex: female    Date:   6/17/2021    Session: 1 of  6  Surgeon:  Matilda Quarles    Height: 68\" Weight:   338     Lbs  BMI: 51     Starting Weight: 336     Overall Pounds Lost: 0   Overall Pounds Gained: 2      Do you smoke? Pt does not smoke    Alcohol intake: Yes  Number of drinks at a time:  0-1  Number of times a week: not often, maybe 1-2x/month    Class Guidelines    Guidelines are reviewed with patient at the start of every class. 1. Patient understands that weight loss trial classes must be consecutive. Patient understands if they miss a class, it is their responsibility to contact me to reschedule class. I will reach out to patient after their first no show. 2.  Patient understands the expectations that weight maintenance/weight loss is expected during the classes. Failure to demonstrate changes may result in one extra month of weight loss trial, followed by going back to see the surgeon. 3. Patient is also instructed to be doing their labs, blood work, psych visit, support group and any other test that the surgeon has used while they are working on their weight loss trial.    Changes Made: Drinking more water - trying for 100 oz/day. Working on eating slower. Dietary Instruction    During today's class, we continued to focus on the key diet principles. Patient was instructed to follow a low carbohydrate diet, focusing on meat and vegetables. Patient was instructed to stop liquid calories and aim for 64 ounces of water per day. We focused on focusing in on bigger problem areas to start making changes to, such as reducing fast food intake, reducing carbonated beverages/soda intake, decreasing carbohydrates intake daily, etc. We reviewed protein shakes and high protein yogurts to chose, as well.      We also reviewed some busted some myths associated with Bariatric surgery during today's webinar. The myths we covered include:  1. Anyone is eligible for surgery - There is a list of criteria and must show the interdisciplinary team that you are actively making changes with diet, exercise and food behaviors. 2. Bariatric Surgery is taking the \"easy way out\" -- Surgery is just a tool. Habits must be changed and this is a life-long process. Surgery can be viewed as a jumpstart. 3. After getting surgery, you can go back to your old eating habits -- Your body will most likely not allow this, due to symptoms such as dumping syndrome, nausea, and vomiting that may come about when eating the wrong foods or amounts. 4. Bariatric Surgery can lead to a transfer of addictions -- Addictions may come about for multiple reasons. It may be important to speak with a mental health professional if you have a family history of addictions. 5. Patients will have nutritional deficiencies after surgery -- As long as patients are taking all recommended vitamins daily, deficiencies are not likely. 6. I will be able to follow a Keto diet or Intermittent fasting -- We stress the importance of following a diet that is high in protein and low in fat and sugars/carbs after surgery, as well as ensuring consistent protein intake throughout the day to keep blood sugars controlled. 7. I am going to constantly be hungry after surgery -- The portion sizes after sugery may seem small to patients prior to surgery, however, the small portions will be more than enough to keep you full and satisfied at each meal.   8. I should not get pregnant after having bariatric surgery -- Pregnancy may actually be safer for both mom and baby after surgery. It is recommended to wait 18 months post-op to conceive.    Patient was educated on the bariatric process within nutrition, and how to avoid and weed through some inaccurate information and opinions that they may read on the Internet regarding bariatric surgery. Patient's dietary habits include: Eating 3 meals daily, consisting of rice, steak w/ sweet sauce and broccoli w/ butter. Biggest portion of meals comes from starch, then meat (usually a large portion of both). Starches consumed: rice and bread. Will have an egg sandwich or deli sandwich for breakfast/lunch. Snacking 2-3x/day on apples, chips, and/or something sweet. Struggling to let go of carbohydrates overall, mostly rice and sandwiches and portion control. Drinking 64- 00 oz of water, 12 oz of diet coke and 16 oz of diet iced tea daily. Physical Activity/Exercise    Comments:     Currently for exercise, patient is walking 1x/day. We talked about activities for patient to do, including walking, swimming, or chair exercises, as well as some additional steps to take in the day to get extra movement, such as parking further away, walking dog, taking stairs vs elevator, etc. Patient was encouraged to start up an exercise routine and build on it. Behavior Modification  Comments:   Emphasized the importance of eating slowly, not eating and drinking meals at the same time. Discussed Key Behavior principles to start implementing to be successful following surgery, such as, importance of 3 meals daily, keeping a food journal, avoid distractions during meal times, and chewing food thoroughly, taking 20-30 minutes to eat a meal, as a few examples. Patient understands the importance of following through with these behaviors following surgery to aid in long term weight loss. Tips and advice were given on how to start implementing these into the patient's life. Patient's S.M.A.R.T. Goals are:  1. Track food  2. Walk 3x/week  3. Eat slower    Patient has not attended a support group meeting.       Anibal Lopez, RD  6/17/2021

## 2021-07-14 ENCOUNTER — HOSPITAL ENCOUNTER (OUTPATIENT)
Dept: BARIATRICS/WEIGHT MGMT | Age: 36
Discharge: HOME | End: 2021-07-14

## 2021-07-14 NOTE — PROGRESS NOTES
[facility]  [address], [address]    Patient's Name: Paul Ramirez   Age: 30 y.o. YOB: 1985   Sex: female    Date:   7/14/2021    Session: 2 of  6  Surgeon:  Leila Kearns    Height: 68\" Weight:    332      Lbs  BMI: 50.4     Previous Month's Weight: 338  Pounds Lost since last month: 6                 Pounds Gained since last month: 0    Starting Weight: 336     Overall Pounds Lost: 4   Overall Pounds Gained: 0      Do you smoke? Pt does not smoke    Alcohol intake: Yes  Number of drinks at a time: 1- max 2  Number of times a week: 0-2; don't drink often, maybe 1-2x/month    Class Guidelines    Guidelines are reviewed with patient at the start of every class. 1. Patient understands that weight loss trial classes must be consecutive. Patient understands if they miss a class, it is their responsibility to contact me to reschedule class. I will reach out to patient after their first no show. 2.  Patient understands the expectations that weight maintenance/weight loss is expected during the classes. Failure to demonstrate changes may result in one extra month of weight loss trial, followed by going back to see the surgeon. 3. Patient is also instructed to be doing their labs, blood work, psych visit, support group and any other test that the surgeon has used while they are working on their weight loss trial.    Changes Made: Drinking lots of water! Good about meal prepping on sundays! Playing w/ dogs and working in garden in evening for exercise; staying away from the couch for long periods of time. Dietary Instruction    During today's class, we continued to focus on the key diet principles. Patient was instructed to follow a low carbohydrate diet, focusing on meat and vegetables. Patient was instructed to stop liquid calories and aim for 64 ounces of water per day.  We focused on focusing in on bigger problem areas to start making changes to, such as reducing fast food intake, reducing carbonated beverages/soda intake, decreasing carbohydrates intake daily, etc. We reviewed protein shakes and high protein yogurts to chose, as well. We also reviewed some ways to stay on track with diet, food behavior, and exercise goals, which included:  1. Portion control and setting up plates with 1/2 veggies, 1/4 protein and 1/4 starch or fruit. a. Using smaller plates and utensils  b. Looking at servings sizes  2. Making healthy protein choices, with low-fat/lean sources of meat examples  3. Menu guide when dining out and choosing baked, grilled, broiled foods, and avoiding foods that are naturally higher in fat and carbohydrates, such as \"alexis, au gratin, battered, breaded, deep-fried\" items listed on the menus. a. Requesting sauces and dressings on the side. 4. Navigating through common food triggers, such as eating in front of the TV, raiding the fridge after coming home from work, and eating out of emotions. Action plans included making the TV a no eating zone, having more consistency with meals, and differentiating between emotional hunger and physical hunger. 5. Decreasing visual temptations tips such as sticking to a grocery list, shopping when not hungry, keeping problem foods away, and keeping healthier foods/snack items in closer reach. 6. Limiting umber of times eating throughout the day, places where eating and watching out for danger zones to help stay on track! 7. Keeping a food journal or food tracking taylor to increase accountability  8. Starting up an exercise routine, as simple as walking 10-15 minutes/day to begin with, and building on it throughout the next few months. a. Stress reliever  b. Increase in metabolism  c. Reduces blood pressure  d. Continual weight loss  9. Getting a walking reji to help increase accountability. 10. Ways to prevent relapse, included:  a. Recognizing reactions  b.  Come in with a plan  c. Remove temptations or environments that are conducive to temptations. Patient's dietary habits include: Eating 3 meals daily, consisting of meal prepped chicken thighs, grilled onions and peppers, 1/4 cup rice and broccoli. Patient reports she often finds herself filling up more on veggies and has been adding a ton to her lunches and dinners. She is working on avoiding over eating starches, like rice, bread - has reduced intake this past month. Snacking 2x/day on apples, granola bar, watermelon or pastry. Patient notes that she has struggled w/ her sweet tooth this past month and has had a few slip ups, but has not let that discourage her from all her progress. Drinking at least 60-80 oz of water/day, but has been working for 128 oz. Drinking 12 oz diet coke daily, but trying to break this habit so will be working on trying to not buy anymore on her next grocery store trip. Physical Activity/Exercise    Comments:     Currently for exercise, patient is walking 1x/week, but also gardening and playing/walking with dogs regularly. We talked about activities for patient to do, including walking, swimming, or chair exercises, as well as some additional steps to take in the day to get extra movement, such as parking further away, walking dog, taking stairs vs elevator, etc. Patient was encouraged to start up an exercise routine and build on it. Behavior Modification  Comments:   Emphasized the importance of eating slowly, not eating and drinking meals at the same time. Discussed Key Behavior principles to start implementing to be successful following surgery, such as, importance of 3 meals daily, keeping a food journal, avoid distractions during meal times, and chewing food thoroughly, taking 20-30 minutes to eat a meal, as a few examples. Patient understands the importance of following through with these behaviors following surgery to aid in long term weight loss.  Tips and advice were given on how to start implementing these into the patient's life. Patient's S.M.A.R.T. Goals are:  1. Walking more; walk 1x/week w/ a friend but will commit to 2x. 2. Reduce diet coke  3. Continue to meal prep lunches    Patient has attended a support group meeting. Patient notes that she went folding and enjoyed it way more than she thought she would! Excited to make it a weekly event.      Chela Amanda, EDWIN  7/14/2021

## 2021-08-11 ENCOUNTER — HOSPITAL ENCOUNTER (OUTPATIENT)
Dept: BARIATRICS/WEIGHT MGMT | Age: 36
Discharge: HOME | End: 2021-08-11

## 2021-08-11 NOTE — PROGRESS NOTES
[facility]  [address], [address]    Patient's Name: Danii Sheridan   Age: 30 y.o. YOB: 1985   Sex: female    Date:   8/11/2021    Session: 3 of  6  Surgeon:  Anita Ontiveros    Height: 68\" Weight:    336      Lbs  BMI: 51     Previous Month's Weight: 332  Pounds Lost since last month: 0                 Pounds Gained since last month: 4    Starting Weight: 336     Overall Pounds Lost: 0   Overall Pounds Gained: 0      Do you smoke? Pt does not smoke    Alcohol intake: Not often  Number of drinks at a time:  1  Number of times a week: monthly    Class Guidelines    Guidelines are reviewed with patient at the start of every class. 1. Patient understands that weight loss trial classes must be consecutive. Patient understands if they miss a class, it is their responsibility to contact me to reschedule class. I will reach out to patient after their first no show. 2.  Patient understands the expectations that weight maintenance/weight loss is expected during the classes. Failure to demonstrate changes may result in one extra month of weight loss trial, followed by going back to see the surgeon. 3. Patient is also instructed to be doing their labs, blood work, psych visit, support group and any other test that the surgeon has used while they are working on their weight loss trial.    Changes Made: Drinking over 50 oz trying for 100 oz of water daily; walking 3x/week; drinking protein shakes and working on limiting rice and pasta. Dietary Instruction    During today's class, we continued to focus on the key diet principles. Patient was instructed to follow a low carbohydrate diet, focusing on meat and vegetables. Patient was instructed to stop liquid calories and aim for 64 ounces of water per day.  We focused on focusing in on bigger problem areas to start making changes to, such as reducing fast food intake, reducing carbonated beverages/soda intake, decreasing carbohydrates intake daily, etc. We reviewed protein shakes and high protein yogurts to chose, as well. We also reviewed some ways to combat emotional eating and cravings, which included:  1. Recognizing what physical hunger feels like vs emotional hunger/cravings  2. Recognizing triggers, whether psychological, emotional or food-related. 3. Starting to listen to what your body is telling you and admitting that there is a habit in order to break it  4. Breaking the association of feelings w/ food and replacing with alternative activities in place of eating, such as meditation, exercise, crafting, new hobbies, positive self talk, etc.  5. Working on not skipping meals to reduce cravings. 6. Confrontation vs. Distraction strategies in fighting emotional eating and cravings  7. Ways to curb cravings, such as sucking on a sour pickle, making TV a no eating zone, fool your sweet tooth, etc.  8. Working on stress reduction and managing stress in areas not revolving around food. Patient's dietary habits include: Eating 3 meals daily, consisting of ground beef, sweet potatoes, onions, veggies and often finds herself filling up on carbs, but has been working on limiting them. Working on focusing on protein and veggies. Starches consist of rice, potatoes, starchy veggies and has done much on reducing breads and pasta. Snacking 2x/day on apples, protein shakes, sometimes chips or something sweets after dinner. Struggling most w/ portion control, resisting cravings, and over eating. Drinking  oz of water, 16 oz of Gatorade  zero, 3-5 diet sodas weekly and has been working on reducing this gradually. Physical Activity/Exercise    Comments:     Currently for exercise, patient is walking 2-3x/week.     We talked about activities for patient to do, including walking, swimming, or chair exercises, as well as some additional steps to take in the day to get extra movement, such as parking further away, walking dog, taking stairs vs elevator, etc. Patient was encouraged to start up an exercise routine and build on it. Behavior Modification  Comments:   Emphasized the importance of eating slowly, not eating and drinking meals at the same time. Discussed Key Behavior principles to start implementing to be successful following surgery, such as, importance of 3 meals daily, keeping a food journal, avoid distractions during meal times, and chewing food thoroughly, taking 20-30 minutes to eat a meal, as a few examples. Patient understands the importance of following through with these behaviors following surgery to aid in long term weight loss. Tips and advice were given on how to start implementing these into the patient's life. Patient's S.M.A.R.T. Goals are:  1. Reduce diet coke  2. Walk 3-5x/week  3. Try to track for one week    Patient has attended a support group meeting. Patient reports this month, has not been craving diet coke as much, has been walking more and feeling overall good.     Hesham Jauregui, EDWIN  8/11/2021

## 2021-09-07 ENCOUNTER — DOCUMENTATION ONLY (OUTPATIENT)
Dept: BARIATRICS/WEIGHT MGMT | Age: 36
End: 2021-09-07

## 2021-09-07 ENCOUNTER — HOSPITAL ENCOUNTER (OUTPATIENT)
Dept: BARIATRICS/WEIGHT MGMT | Age: 36
Discharge: HOME | End: 2021-09-07

## 2021-09-07 NOTE — PROGRESS NOTES
[address] [facility] [address], [address]    Patient's Name: Danii Sheridan   Age: 30 y.o. YOB: 1985   Sex: female    Date:   9/7/2021    Insurance:              Session: 4 of  6  Surgeon:  Dr. Anita Ontiveros    Height: 5 f 8   Weight:    334      Lbs. BMI: 50.9   Pounds Lost since last month: 2                Pounds Gained since last month: 0    Starting Weight: 336     Previous Months Weight: 336  Overall Pounds Lost: 2   Overall Pounds Gained: 0    Smoking:  None    Alcohol intake:  Number of drinks at a time:  1-2 drinks a month  Number of times a week:     Class Guidelines    Guidelines are reviewed with patient at the start of every class. 1. Patient understands that weight loss trial classes must be consecutive. Patient understands if they miss a class, it is their responsibility to contact me to reschedule class. I will reach out to patient after their first no show. 2.  Patient understands the expectations that weight maintenance/weight loss is expected during the classes. Failure to demonstrate changes may result in one extra month of weight loss trial, followed by going back to see the surgeon. 3. Patient is also instructed to be doing their labs, blood work, psych visit, support group and any other test that the surgeon has used while they are working on their weight loss trial.    Other Pertinent Information:     Changes Made Since Last Class: Working on limiting carbohydrates, Drinking shakes, walking 3-5 x a week, Drinking without a straw    Eating Habits and Behaviors      During today's class, we continued to focus on the key diet principles. Patient was instructed to follow a low carbohydrate diet, focusing on meat and vegetables. Patient was instructed to stop liquid calories and aim for 64 ounces of water per day.  We focused on focusing in on bigger problem areas to start making changes to, such as reducing fast food intake, reducing carbonated beverages/soda intake, decreasing carbohydrates intake daily, etc. We reviewed protein shakes and high protein yogurts to chose, as well. During today's class, we also talked about how to read a label. Patient was given information on:  1. The benefits of reading a label, which allowed one to compare the nutritional value of similar products and make healthy food decisions. 2. The ingredient list, which can help to determine if the food is heathy or something that fits into the diet. 3. The importance of reading the serving size and making sure to apply that to the portion size that they are consuming. Patient was also educated on carbohydrates. I talked to patient about:  1. The function of carbohydrates. 2. Foods that carbohydrate-heavy. 3. Patient was given the guidelines to keep their carbohydrates less than 75 grams per day in the pre-op phase. 4. Patient was also given ideas of low carb swaps, which include zucchini noodles, spaghetti squash, or cauliflower rice. 5. Lower carbohydrate fruit options were discussed. 6. Discussed lower carb swaps to use instead of potato chips. Patient's dietary habits include: Patient is eating 3 meal per day. Meals are made up of scrambled eggs, apples, shakes, sweet potatoes, vegetables, chicken, steak. Portions are:  Being worked on reducing. Patient is eating out: 3-4 x a week. Patient is snacking 2-4 x during the week, but not on the weekends. She states this is an apple, rice cakes, shake, hard boiled eggs, deli meat. I have talked to patient about some lower carbohydrate snack choices that focused more protein. Patient is drinking  ounces of fluid per day. Fluid intake is make up of: water. She is still doing some diet soda, but is working to reduce this. Physical Activity/Exercise     Comments:     Currently for exercise, patient waking for 1.5 miles, 3-5 x a week.   I have talked to patient about some suggestions to start an exercise routine. Patient is encouraged to purchase a pedometer and use this to track her steps. I have made some suggestions to patient of ways to incorporate exercise in with a busy lifestyle. We also talked about You Tube videos that can be used for an exercise routine. Behavior Modification  Comments:  Behavior modifications were discussed with the patient. Some of those behavior modifications include:  1. Emphasized the importance of eating slowly, not eating and drinking meals at the same time. 2.  Taking 20-30 minutes to eat a meal  3. I have encouraged patient to follow journal, which may be done by paper or tracking it an taylor, such as My Fitness Pal or PageFreezer. [address]. Patient understands the importance of following through with these behaviors following surgery to aid in long term weight loss. Tips and advice were given on how to start implementing these into the patient's life. Patient has attended the required bariatric support group. Goal patient has set for next month:  1. Continue to reduce carbohydrates  2. Incorporate an exercise video.     Jessica Randolph Sanket 87 RD  9/7/2021

## 2021-09-14 ENCOUNTER — HOSPITAL ENCOUNTER (OUTPATIENT)
Dept: LAB | Age: 36
Discharge: HOME | End: 2021-09-14
Payer: COMMERCIAL

## 2021-09-14 DIAGNOSIS — R73.01 IFG (IMPAIRED FASTING GLUCOSE): ICD-10-CM

## 2021-09-14 DIAGNOSIS — E66.01 MORBID (SEVERE) OBESITY DUE TO EXCESS CALORIES (HCC): ICD-10-CM

## 2021-09-14 DIAGNOSIS — E28.2 PCOS (POLYCYSTIC OVARIAN SYNDROME): ICD-10-CM

## 2021-09-14 DIAGNOSIS — K21.9 GASTROESOPHAGEAL REFLUX DISEASE, UNSPECIFIED WHETHER ESOPHAGITIS PRESENT: ICD-10-CM

## 2021-09-14 DIAGNOSIS — Z01.818 PRE-OP TESTING: ICD-10-CM

## 2021-09-14 DIAGNOSIS — R06.83 SNORING: ICD-10-CM

## 2021-09-14 DIAGNOSIS — R73.09 ELEVATED HEMOGLOBIN A1C: ICD-10-CM

## 2021-09-14 DIAGNOSIS — N39.3 SUI (STRESS URINARY INCONTINENCE, FEMALE): ICD-10-CM

## 2021-09-14 DIAGNOSIS — E66.01 MORBID OBESITY (HCC): Primary | ICD-10-CM

## 2021-09-14 LAB
25(OH)D3 SERPL-MCNC: 23.7 NG/ML (ref 30–100)
ALBUMIN SERPL-MCNC: 3.9 G/DL (ref 3.4–5)
ANION GAP SERPL CALC-SCNC: 6 MMOL/L (ref 3–18)
APPEARANCE UR: ABNORMAL
BACTERIA URNS QL MICRO: ABNORMAL /HPF
BASOPHILS # BLD: 0 K/UL (ref 0–0.1)
BASOPHILS NFR BLD: 0 % (ref 0–2)
BILIRUB UR QL: NEGATIVE
BUN SERPL-MCNC: 13 MG/DL (ref 7–18)
BUN/CREAT SERPL: 18 (ref 12–20)
CALCIUM SERPL-MCNC: 8.8 MG/DL (ref 8.5–10.1)
CHLORIDE SERPL-SCNC: 109 MMOL/L (ref 100–111)
CHOLEST SERPL-MCNC: 173 MG/DL
CO2 SERPL-SCNC: 24 MMOL/L (ref 21–32)
COLOR UR: YELLOW
CREAT SERPL-MCNC: 0.73 MG/DL (ref 0.6–1.3)
DIFFERENTIAL METHOD BLD: NORMAL
EOSINOPHIL # BLD: 0.1 K/UL (ref 0–0.4)
EOSINOPHIL NFR BLD: 1 % (ref 0–5)
EPITH CASTS URNS QL MICRO: ABNORMAL /LPF (ref 0–5)
ERYTHROCYTE [DISTWIDTH] IN BLOOD BY AUTOMATED COUNT: 12.4 % (ref 11.6–14.5)
EST. AVERAGE GLUCOSE BLD GHB EST-MCNC: 111 MG/DL
FERRITIN SERPL-MCNC: 54 NG/ML (ref 8–388)
FOLATE SERPL-MCNC: 19.7 NG/ML (ref 3.1–17.5)
GLUCOSE SERPL-MCNC: 101 MG/DL (ref 74–99)
GLUCOSE UR STRIP.AUTO-MCNC: NEGATIVE MG/DL
HBA1C MFR BLD: 5.5 % (ref 4.2–5.6)
HCT VFR BLD AUTO: 40.1 % (ref 35–45)
HDLC SERPL-MCNC: 45 MG/DL (ref 40–60)
HDLC SERPL: 3.8 {RATIO} (ref 0–5)
HGB BLD-MCNC: 13.3 G/DL (ref 12–16)
HGB UR QL STRIP: ABNORMAL
IRON SERPL-MCNC: 101 UG/DL (ref 50–175)
KETONES UR QL STRIP.AUTO: NEGATIVE MG/DL
LDLC SERPL CALC-MCNC: 106.4 MG/DL (ref 0–100)
LEUKOCYTE ESTERASE UR QL STRIP.AUTO: ABNORMAL
LIPID PROFILE,FLP: ABNORMAL
LYMPHOCYTES # BLD: 1.8 K/UL (ref 0.9–3.6)
LYMPHOCYTES NFR BLD: 31 % (ref 21–52)
MCH RBC QN AUTO: 29.8 PG (ref 24–34)
MCHC RBC AUTO-ENTMCNC: 33.2 G/DL (ref 31–37)
MCV RBC AUTO: 89.9 FL (ref 78–100)
MONOCYTES # BLD: 0.4 K/UL (ref 0.05–1.2)
MONOCYTES NFR BLD: 7 % (ref 3–10)
NEUTS SEG # BLD: 3.5 K/UL (ref 1.8–8)
NEUTS SEG NFR BLD: 60 % (ref 40–73)
NITRITE UR QL STRIP.AUTO: NEGATIVE
PH UR STRIP: 5.5 [PH] (ref 5–8)
PLATELET # BLD AUTO: 227 K/UL (ref 135–420)
PMV BLD AUTO: 10.6 FL (ref 9.2–11.8)
POTASSIUM SERPL-SCNC: 4.1 MMOL/L (ref 3.5–5.5)
PROT UR STRIP-MCNC: NEGATIVE MG/DL
RBC # BLD AUTO: 4.46 M/UL (ref 4.2–5.3)
RBC #/AREA URNS HPF: ABNORMAL /HPF (ref 0–5)
SODIUM SERPL-SCNC: 139 MMOL/L (ref 136–145)
SP GR UR REFRACTOMETRY: 1.02 (ref 1–1.03)
TRIGL SERPL-MCNC: 108 MG/DL (ref ?–150)
TSH SERPL DL<=0.05 MIU/L-ACNC: 1.91 UIU/ML (ref 0.36–3.74)
UROBILINOGEN UR QL STRIP.AUTO: 0.2 EU/DL (ref 0.2–1)
VIT B12 SERPL-MCNC: 339 PG/ML (ref 211–911)
VLDLC SERPL CALC-MCNC: 21.6 MG/DL
WBC # BLD AUTO: 5.8 K/UL (ref 4.6–13.2)
WBC URNS QL MICRO: ABNORMAL /HPF (ref 0–5)

## 2021-09-14 PROCEDURE — 82306 VITAMIN D 25 HYDROXY: CPT

## 2021-09-14 PROCEDURE — 82607 VITAMIN B-12: CPT

## 2021-09-14 PROCEDURE — 84425 ASSAY OF VITAMIN B-1: CPT

## 2021-09-14 PROCEDURE — 80048 BASIC METABOLIC PNL TOTAL CA: CPT

## 2021-09-14 PROCEDURE — 85025 COMPLETE CBC W/AUTO DIFF WBC: CPT

## 2021-09-14 PROCEDURE — 83540 ASSAY OF IRON: CPT

## 2021-09-14 PROCEDURE — 80061 LIPID PANEL: CPT

## 2021-09-14 PROCEDURE — 82728 ASSAY OF FERRITIN: CPT

## 2021-09-14 PROCEDURE — 82040 ASSAY OF SERUM ALBUMIN: CPT

## 2021-09-14 PROCEDURE — 81001 URINALYSIS AUTO W/SCOPE: CPT

## 2021-09-14 PROCEDURE — 36415 COLL VENOUS BLD VENIPUNCTURE: CPT

## 2021-09-14 PROCEDURE — 83036 HEMOGLOBIN GLYCOSYLATED A1C: CPT

## 2021-09-14 PROCEDURE — 84443 ASSAY THYROID STIM HORMONE: CPT

## 2021-09-16 ENCOUNTER — TELEPHONE (OUTPATIENT)
Dept: SURGERY | Age: 36
End: 2021-09-16

## 2021-09-16 NOTE — TELEPHONE ENCOUNTER
Spoke to pt made aware vit d 3 low will start 5,000 units daily  states an understanding of instructions

## 2021-09-21 ENCOUNTER — HOSPITAL ENCOUNTER (OUTPATIENT)
Dept: LAB | Age: 36
Discharge: HOME | End: 2021-09-21
Payer: COMMERCIAL

## 2021-09-21 DIAGNOSIS — E66.01 MORBID OBESITY (HCC): ICD-10-CM

## 2021-09-21 PROCEDURE — 93005 ELECTROCARDIOGRAM TRACING: CPT

## 2021-09-23 LAB
ATRIAL RATE: 66 BPM
CALCULATED P AXIS, ECG09: 29 DEGREES
CALCULATED R AXIS, ECG10: 47 DEGREES
CALCULATED T AXIS, ECG11: 32 DEGREES
DIAGNOSIS, 93000: NORMAL
P-R INTERVAL, ECG05: 142 MS
Q-T INTERVAL, ECG07: 394 MS
QRS DURATION, ECG06: 94 MS
QTC CALCULATION (BEZET), ECG08: 413 MS
VENTRICULAR RATE, ECG03: 66 BPM

## 2021-09-24 LAB — VIT B1 BLD-SCNC: 121.3 NMOL/L (ref 66.5–200)

## 2021-09-30 ENCOUNTER — OFFICE VISIT (OUTPATIENT)
Dept: FAMILY MEDICINE CLINIC | Age: 36
End: 2021-09-30
Payer: COMMERCIAL

## 2021-09-30 VITALS
OXYGEN SATURATION: 98 % | DIASTOLIC BLOOD PRESSURE: 81 MMHG | HEART RATE: 70 BPM | TEMPERATURE: 98 F | BODY MASS INDEX: 51.24 KG/M2 | WEIGHT: 293 LBS | SYSTOLIC BLOOD PRESSURE: 120 MMHG | RESPIRATION RATE: 20 BRPM

## 2021-09-30 DIAGNOSIS — Z00.00 ROUTINE GENERAL MEDICAL EXAMINATION AT A HEALTH CARE FACILITY: Primary | ICD-10-CM

## 2021-09-30 DIAGNOSIS — E53.8 VITAMIN B12 DEFICIENCY: ICD-10-CM

## 2021-09-30 DIAGNOSIS — Z23 NEEDS FLU SHOT: ICD-10-CM

## 2021-09-30 DIAGNOSIS — E55.9 VITAMIN D DEFICIENCY: ICD-10-CM

## 2021-09-30 DIAGNOSIS — Z23 ENCOUNTER FOR IMMUNIZATION: ICD-10-CM

## 2021-09-30 DIAGNOSIS — R73.03 PREDIABETES: ICD-10-CM

## 2021-09-30 PROCEDURE — 90472 IMMUNIZATION ADMIN EACH ADD: CPT | Performed by: INTERNAL MEDICINE

## 2021-09-30 PROCEDURE — 90715 TDAP VACCINE 7 YRS/> IM: CPT | Performed by: INTERNAL MEDICINE

## 2021-09-30 PROCEDURE — 90471 IMMUNIZATION ADMIN: CPT | Performed by: INTERNAL MEDICINE

## 2021-09-30 PROCEDURE — 90686 IIV4 VACC NO PRSV 0.5 ML IM: CPT | Performed by: INTERNAL MEDICINE

## 2021-09-30 PROCEDURE — 99395 PREV VISIT EST AGE 18-39: CPT | Performed by: INTERNAL MEDICINE

## 2021-09-30 NOTE — PROGRESS NOTES
Pretty Graf presents today for   Chief Complaint   Patient presents with    Physical       Is someone accompanying this pt? no    Is the patient using any DME equipment during OV? no    Depression Screening:  3 most recent PHQ Screens 9/30/2021   Little interest or pleasure in doing things Not at all   Feeling down, depressed, irritable, or hopeless Not at all   Total Score PHQ 2 0       Learning Assessment:  Learning Assessment 6/4/2019   PRIMARY LEARNER Patient   HIGHEST LEVEL OF EDUCATION - PRIMARY LEARNER  4 YEARS OF COLLEGE   BARRIERS PRIMARY LEARNER NONE   CO-LEARNER CAREGIVER No   PRIMARY LANGUAGE ENGLISH   LEARNER PREFERENCE PRIMARY LISTENING   ANSWERED BY patient   RELATIONSHIP SELF       Abuse Screening:  Abuse Screening Questionnaire 4/22/2021   Do you ever feel afraid of your partner? N   Are you in a relationship with someone who physically or mentally threatens you? N   Is it safe for you to go home? Y       Fall Risk  No flowsheet data found. Health Maintenance reviewed and discussed and ordered per Provider. Health Maintenance Due   Topic Date Due    Hepatitis C Screening  Never done    COVID-19 Vaccine (1) Never done    DTaP/Tdap/Td series (1 - Tdap) Never done    Cervical cancer screen  06/08/2020    Flu Vaccine (1) 09/01/2021   . Coordination of Care:  1. Have you been to the ER, urgent care clinic since your last visit? Hospitalized since your last visit? no    2. Have you seen or consulted any other health care providers outside of the [address] [place] [address] since your last visit? Include any pap smears or colon screening.  no      Last  Checked na  Last UDS Checked na  Last Pain contract signed: marcia

## 2021-09-30 NOTE — PROGRESS NOTES
ANNUAL PHYSICAL EXAMINATION    History of Present Illness  Ann Munoz is a 30 y.o. female who presents today for management of    Chief Complaint   Patient presents with    Physical       Patient is currently on a bariatric surgery program. She is thinking about of getting a gastric sleeve. Gynecologic History  Patient's last menstrual period was Patient's last menstrual period was 09/20/2021. Conner Phillips Contraception: none  Last Pap: 9/2021 done by GYN  Results: normal    Health Maintenance  Colon cancer: due at age 40  Dyslipidemia: done  Diabetes mellitus: done  Influenza vaccine: due  Pneumococcal vaccine: not Indicated  Tdap: due  Herpes Zoster vaccine: due at age 49  Hep B vaccine: not Indicated   Weight: Body mass index is Estimated body mass index is Body mass index is 51.24 kg/m². Conner Phillips Discussed the patient's BMI with her. The BMI follow up plan is as follows: Improve diet and 30 min of moderate activity at least 5 times a week. Diet:  Generally healthy  Exercise:  no  Seatbelt: yes  Dentist: yes      Past Medical History  Past Medical History:   Diagnosis Date    Diabetes ([address]     Pre DM    Obesity (BMI 35.0-39.9 without comorbidity)     PCOS (polycystic ovarian syndrome)     Plantar fasciitis         Surgical History  Past Surgical History:   Procedure Laterality Date    HX GYN  2020    polypectomy uterus     HX TONSILLECTOMY          Current Medications  Current Outpatient Medications   Medication Sig    aspirin delayed-release 81 mg tablet Take  by mouth daily. No current facility-administered medications for this visit.        Allergies/Drug Reactions  Allergies   Allergen Reactions    Augmentin [Amoxicillin-Pot Clavulanate] Nausea and Vomiting        Family History  Family History   Problem Relation Age of Onset    Thyroid Disease Mother     Arthritis-osteo Mother     Obesity Mother     Sleep Apnea Mother     Heart Attack Mother 36    Hypertension Father     Thyroid Disease Father  Sleep Apnea Brother     Obesity Brother         Social History  Social History     Socioeconomic History    Marital status:      Spouse name: Not on file    Number of children: Not on file    Years of education: Not on file    Highest education level: Not on file   Tobacco Use    Smoking status: Never Smoker    Smokeless tobacco: Never Used   Vaping Use    Vaping Use: Never used   Substance and Sexual Activity    Alcohol use: Yes     Alcohol/week: 1.0 standard drinks     Types: 1 Glasses of wine per week     Comment: socially    Drug use: No    Sexual activity: Yes     Partners: Male     Birth control/protection: None     Social Determinants of Health     Financial Resource Strain:     Difficulty of Paying Living Expenses:    Food Insecurity:     Worried About Running Out of Food in the Last Year:     [address] in the Last Year:    Transportation Needs:     Lack of Transportation (Medical):      Lack of Transportation (Non-Medical):    Physical Activity:     Days of Exercise per Week:     Minutes of Exercise per Session:    Stress:     Feeling of Stress :    Social Connections:     Frequency of Communication with Friends and Family:     Frequency of Social Gatherings with Friends and Family:     Attends [employer] Services:     Active Member of Clubs or Organizations:     Attends Club or Organization Meetings:     Marital Status:         Health Maintenance   Topic Date Due    Hepatitis C Screening  Never done    DTaP/Tdap/Td series (1 - Tdap) Never done    Cervical cancer screen  06/08/2020    Flu Vaccine (1) 09/01/2021    A1C test (Diabetic or Prediabetic)  09/14/2022    COVID-19 Vaccine  Completed    Pneumococcal 0-64 years  Aged [employer] History   Administered Date(s) Administered    COVID-19, PFIZER, MRNA, LNP-S, PF, 30MCG/0.3ML DOSE 03/24/2021, 04/09/2021    Influenza Vaccine (Quad) PF (>6 Mo Flulaval, Fluarix, and >3 Yrs [address] [place] [address], Fluzone [id number]) 10/02/2019    Rho(D) Immune Globulin - IM 11/16/2020    TB Skin Test (PPD) Intradermal 10/02/2019       Review of Systems  General ROS: negative for - chills, fatigue or fever  Psychological ROS: negative  Ophthalmic ROS: negative  ENT ROS: negative  Allergy and Immunology ROS: negative  Hematological and Lymphatic ROS: negative  Endocrine ROS: negative  Breast ROS: negative for breast lumps  Respiratory ROS: no cough, shortness of breath, or wheezing  Cardiovascular ROS: no chest pain or dyspnea on exertion  Gastrointestinal ROS: no abdominal pain, change in bowel habits, or black or bloody stools  Genito-Urinary ROS: no dysuria, trouble voiding, or hematuria  Musculoskeletal ROS: negative  Neurological ROS: negative  Dermatological ROS: negative      No exam data present      Physical Exam  Vital signs:   Vitals:    09/30/21 1048   BP: 120/81   Pulse: 70   Resp: 20   Temp: 98 °F (36.7 °C)   SpO2: 98%   Weight: 337 lb (152.9 kg)       General: alert, oriented, not in distress  Head: scalp normal, atraumatic  Eyes: pupils are equal and reactive, full and intact EOM's  Ears: patent ear canal, intact tympanic membrane  Nose: normal turbinates, no congestion or discharge  Lips/Mouth: moist lips and buccal mucosa, non-enlarged tonsils, pink throat  Neck: supple, no JVD, no lymphadenopathy, non-palpable thyroid  Chest/Lungs: clear breath sounds, no wheezing or crackles  Breasts: right breast normal without mass, skin or nipple changes or axillary nodes, left breast normal without mass, skin or nipple changes or axillary nodes  Heart: normal rate, regular rhythm, no murmur  Abdomen: soft, non-distended, non-tender, normal bowel sounds, no organomegaly, no masses  Extremities: no focal deformities, no edema  Skin: no active skin lesions    Laboratory/Tests:  Lab Results   Component Value Date/Time    WBC 5.8 09/14/2021 07:35 AM    HGB 13.3 09/14/2021 07:35 AM    HCT 40.1 09/14/2021 07:35 AM    PLATELET 883 18/44/5059 07:35 AM    MCV 89.9 09/14/2021 07:35 AM     Lab Results   Component Value Date/Time    Cholesterol, total 173 09/14/2021 07:35 AM    HDL Cholesterol 45 09/14/2021 07:35 AM    LDL, calculated 106.4 (H) 09/14/2021 07:35 AM    Triglyceride 108 09/14/2021 07:35 AM    CHOL/HDL Ratio 3.8 09/14/2021 07:35 AM     Lab Results   Component Value Date/Time    TSH 1.91 09/14/2021 07:35 AM    T4, Free 1.2 01/25/2016 03:24 PM      Lab Results   Component Value Date/Time    Sodium 139 09/14/2021 07:35 AM    Potassium 4.1 09/14/2021 07:35 AM    Chloride 109 09/14/2021 07:35 AM    CO2 24 09/14/2021 07:35 AM    Anion gap 6 09/14/2021 07:35 AM    Glucose 101 (H) 09/14/2021 07:35 AM    BUN 13 09/14/2021 07:35 AM    Creatinine 0.73 09/14/2021 07:35 AM    BUN/Creatinine ratio 18 09/14/2021 07:35 AM    GFR est AA >60 09/14/2021 07:35 AM    GFR est non-AA >60 09/14/2021 07:35 AM    Calcium 8.8 09/14/2021 07:35 AM    Bilirubin, total 0.4 11/16/2020 09:50 AM    ALT (SGPT) 31 11/16/2020 09:50 AM    Alk. phosphatase 74 11/16/2020 09:50 AM    Protein, total 7.6 11/16/2020 09:50 AM    Albumin 3.9 09/14/2021 07:35 AM    Globulin 3.9 11/16/2020 09:50 AM    A-G Ratio 0.9 11/16/2020 09:50 AM      Lab Results   Component Value Date/Time    Hemoglobin A1c 5.5 09/14/2021 07:35 AM         Assessment/Plan:      ICD-10-CM ICD-9-CM    1. Routine general medical examination at a health care facility  Z00.00 V70.0    2. Prediabetes  R73.03 790.29    3. Vitamin D deficiency  E55.9 268.9    4. Vitamin B12 deficiency  E53.8 266.2    5. Needs flu shot  Z23 V04.81 INFLUENZA VIRUS VAC QUAD,SPLIT,PRESV FREE SYRINGE IM   6.  Encounter for immunization  Z23 V03.89 TETANUS, DIPHTHERIA TOXOIDS AND ACELLULAR PERTUSSIS VACCINE (TDAP), IN INDIVIDS. >=7, IM       Bariatric surgery follow-up - signed and faxed form    Vitamin B12 deficiency - start vitamin B12 500mcg per day    Patient Counseling:  --Nutrition: Stressed importance of moderation in sodium/caffeine intake, saturated fat and cholesterol, caloric balance, sufficient intake of fresh fruits, vegetables. --Exercise: Stressed the importance of regular exercise. --Injury prevention: Discussed safety belts, safety helmets, smoke detector, smoking near bedding or upholstery. --Dental health: Discussed importance of regular tooth brushing, flossing, and dental visits. --Immunizations reviewed. Follow-up and Dispositions    · Return in about 1 year (around 9/30/2022), or as needed, for annual physical exam.           I have discussed the diagnosis with the patient and the intended plan as seen in the above orders. The patient has received an after-visit summary and questions were answered concerning future plans. I have discussed medication side effects and warnings with the patient as well. I have reviewed the plan of care with the patient, accepted their input and they are in agreement with the treatment goals.        Tim Goodman MD  September 30, 2021

## 2021-10-05 ENCOUNTER — HOSPITAL ENCOUNTER (OUTPATIENT)
Dept: BARIATRICS/WEIGHT MGMT | Age: 36
Discharge: HOME | End: 2021-10-05

## 2021-10-05 ENCOUNTER — DOCUMENTATION ONLY (OUTPATIENT)
Dept: BARIATRICS/WEIGHT MGMT | Age: 36
End: 2021-10-05

## 2021-10-05 NOTE — PROGRESS NOTES
[address] [place] [address] [facility], [address]    Patient's Name: Georges Taylor   Age: 30 y.o. YOB: 1985   Sex: female      Insurance:              Session: 5 of  6  Surgeon:  Dr. Bereket Smith    Height: 5 f 8 Weight:    332      Lbs. BMI:    Pounds Lost since last month: 2               Pounds Gained since last month: 0    Starting Weight: 336   Previous Months Weight: 334  Overall Pounds Lost: 4 Overall Pounds Gained: 0      Do you smoke? None    Alcohol intake:  Number of drinks at a time:  1-2 glasses of wine a month  Number of times a week:     Class Guidelines    Guidelines are reviewed with patient at the start of every class. 1. Patient understands that weight loss trial classes must be consecutive. Patient understands if they miss a class, it is their responsibility to contact me to reschedule class. I will reach out to patient after their first no show. 2.  Patient understands the expectations that weight maintenance/weight loss is expected during the classes. Failure to demonstrate changes may result in one extra month of weight loss trial, followed by going back to see the surgeon. 3. Patient is also instructed to be doing their labs, blood work, psych visit, support group and any other test that the surgeon has used while they are working on their weight loss trial.  4. Patient is instructed to bring their education binder to all classes. Changes Made Since Last Class:     Eating Habits and Behaviors      Today we reviewed key diet principles. We talked about patient following a low calorie/low carbohydrate diet while they are in weight loss trials. To achieve this, patient is encouraged to avoid liquid calories, including alcohol, soda, sweet tea, and fruit juices. Patient can cut carbohydrates by trying to stick to meat and vegetables.   Patient can also eat eggs, cheese, and good fat, while trying to eliminate starches, such as pasta, rice, crackers, chips, popcorn. I also gave a power point that included 21 Ways to Stay on Track with a Healthy Lifestyle. Some of the food-related suggestions included drinking plenty of water or calorie-free beverages prior to their meal.  Patient is encouraged to to fill up on protein first, which is the ultimate fill-me up food. We talked about the importance of eating breakfast and the effects that can happen if one skips meals, which includes eating larger portions, snacking more, and decreasing their metabolism. With the suggestions in the power point, patient will be able to decrease their calories and carbohydrate intake. Patient's dietary habits include: Patient is eating 3  meals per day. Meals are made up of shakes and a apple, turkey chili with vegetables, salad, chicken, bunless burgers. Portions are:  Lunch has gotten smaller. Dinorah Chavira is still a struggle to reduce. Patient is eating out: 1 x during the week (Monday-Friday) and 2-3 x on Friday evening-Sunday. Patient's intake of bread, rice, pasta or other carbohydrates is:  Has been reduced. Patient is snacking on protein shake, eggs, apple, cheese. Patient is eating sweets 3-4 x times a week. I have talked to patient about some lower carbohydrate snack choices that focused more protein. Patient is drinking  ounces of fluid per day. Fluid intake is make up of: water and once in a while a diet soda. Physical Activity/Exercise    Comments: We talked about the importance of establishing a work out routine. Patient is currently playing golf 1 x a week or walking 3 x a week for 30 minutes for activity. Goals have been set. Behavior Modification       Comments:   Some of the behavior tips that were included in the power point, include being choosy about night time snacking.   Patient was encouraged to make the TV a no eating zone and not eat after 7 pm.  Patient is also encouraged to keep a food journal.      One of the other things we talked about during class is whether or not patient has a support system. Patient has been to a support group. Goals set by Registered Dietitian:  1. Walk 5 x a week  2.  Smaller portions     Yahaira Morley RD  10/5/2021

## 2021-10-07 LAB — UREA BREATH TEST QL: NEGATIVE

## 2021-10-22 ENCOUNTER — HOSPITAL ENCOUNTER (OUTPATIENT)
Dept: GENERAL RADIOLOGY | Age: 36
Discharge: HOME | End: 2021-10-22
Payer: COMMERCIAL

## 2021-10-22 DIAGNOSIS — R06.83 SNORING: ICD-10-CM

## 2021-10-22 DIAGNOSIS — R73.01 IFG (IMPAIRED FASTING GLUCOSE): ICD-10-CM

## 2021-10-22 DIAGNOSIS — R73.09 ELEVATED HEMOGLOBIN A1C: ICD-10-CM

## 2021-10-22 DIAGNOSIS — K21.9 GASTROESOPHAGEAL REFLUX DISEASE, UNSPECIFIED WHETHER ESOPHAGITIS PRESENT: ICD-10-CM

## 2021-10-22 DIAGNOSIS — E66.01 MORBID (SEVERE) OBESITY DUE TO EXCESS CALORIES (HCC): ICD-10-CM

## 2021-10-22 DIAGNOSIS — N39.3 SUI (STRESS URINARY INCONTINENCE, FEMALE): ICD-10-CM

## 2021-10-22 DIAGNOSIS — E28.2 PCOS (POLYCYSTIC OVARIAN SYNDROME): ICD-10-CM

## 2021-10-22 DIAGNOSIS — Z01.818 PRE-OP TESTING: ICD-10-CM

## 2021-10-22 PROCEDURE — 71046 X-RAY EXAM CHEST 2 VIEWS: CPT

## 2021-11-02 ENCOUNTER — DOCUMENTATION ONLY (OUTPATIENT)
Dept: BARIATRICS/WEIGHT MGMT | Age: 36
End: 2021-11-02

## 2021-11-02 ENCOUNTER — HOSPITAL ENCOUNTER (OUTPATIENT)
Dept: BARIATRICS/WEIGHT MGMT | Age: 36
Discharge: HOME | End: 2021-11-02

## 2021-11-02 NOTE — PROGRESS NOTES
Nutrition Evaluation    Patient's Name: Janiya Neri   Age: 29 y.o. YOB: 1985   Sex: female    Height:  5 f 8 Weight: 334 BMI:    Starting Weight:  336        Smoking Status:  None  Alcohol Intake:  Number of Drinks at a Time: None  Number of Times a Week: None    Changes made during classes include:  Cut out carbohydrates and sweets  Drinking more water  Has an activity routine in place    Summary:  I feel that Janiya Neri has demonstrated appropriate diet changes and is ready to move forward with surgery. Patient has been briefed on the importance of the protein drinks, vitamins, and the transition of the diet stages. Patient understands that the long-term diet will focus on protein and vegetables. Patient understand the effects of carbohydrates after surgery and what reactive hypoglycemia is. Patient is aware that they will be attending pre-op class 2 weeks before surgery and will get more detailed information on the post-op diet guidelines. Patient will see me again at 6 weeks post-op. At this 6 week visit, RD will assess how patient is tolerating soft protein and advance to vegetables, if tolerating soft protein without difficulty. Patient will also see RD again at 9 months post-op. This visit will assess patient's compliance with current protocol, including diet, vitamins, protein shakes, and exercise. Post-op diet guidelines will be reinforced. RD is available for questions and to meet with patient outside of the 6 week and 9 month post-op visit. We spent a lot of time talking about the vitamins. Patient understands the importance of being compliant with the diet protocol and the complications and risks that can occur if they are non-compliant with the nutritional protocol. Patient has attended at least one support group.     Candidate for surgery: Yes  Re-evaluation Date:     Procedure:  Gastric Bypass     Donna Yoo MS RD  11/2/2021

## 2021-11-02 NOTE — PROGRESS NOTES
[address] [place] [address] [facility], [address]    Patient's Name: Chang Mason   Age: 29 y.o. YOB: 1985   Sex: female      Insurance:            Session: 6 of 6  Revision:   Surgeon:  Dr. Buffy Mock    Height: 5 f 8 Weight:    333      Lbs. BMI:    Pounds Lost since last month: 0               Pounds Gained since last month: 1    Starting Weight: 336   Previous Months Weight: 332  Overall Pounds Lost: 3 Overall Pounds Gained: 0      Do you smoke? None    Alcohol intake:  Number of drinks at a time:  None  Number of times a week: None    Class Guidelines    Guidelines are reviewed with patient at the start of every class. 1. Patient understands that weight loss trial classes must be consecutive. Patient understands if they miss a class, it is their responsibility to contact me to reschedule class. I will reach out to patient after their first no show. 2.  Patient understands the expectations that weight maintenance/weight loss is expected during the classes. Failure to demonstrate changes may result in one extra month of weight loss trial, followed by going back to see the surgeon. 3. Patient is also instructed to be doing their labs, blood work, psych visit, support group and any other test that the surgeon has used while they are working on their weight loss trial.    Other Pertinent Information:     Changes Made Since Last Class: 1-2 glasses of wine a month      Dietary Instruction    During today's class we continued to focus on the key diet principles. Patient was instructed to follow a low carbohydrate diet, focusing on meat and vegetables. Patient was instructed to stop liquid calories and aim for 64 ounces of water per day. In class, I also gave patient a power point on surviving the holidays.   Some of the tips included survival tips for parties, including bringing their own low carbohydrate dish to a potluck dinner and surveying the buffet line before they start filling up their plate. Patient was given cooking alternatives, including using Splenda for sugar, substituting applesauce for oil in recipes, and using low fat plain yogurt instead of sour cream in dips. Patient was also encouraged to be mindful of calories in alcohol. Patient is eating 3 meals per day. Patient states their last meal or snack of the day is at 6 pm and they eat within 1 hour of waking up in the morning. Patient is encouraged to eat within 1 hour of waking up and have a cut off time in the evening. If patient is physically hungry, it is encouraged to have a protein-based snack. Patient feels that their meals are: protein, vegetables, making it lower carbohydrates. In terms of managing portions, patient is using a salad size plate. I have made suggestions to use a smaller plate or to fill up on water before eating a meal.  Patient is eating out 3-4 times a week. Choices when eating out include:  salad and grilled chicken. Patient is eating bread, rice, pasta, crackers, etc. 0 times a week? Patient is snacking on: protein shake, hard boiled egg, lunch meat. Sweet intake is 0-1 x  A week. We talked about dumping syndrome and the effects of eating sugar. Patient is drinking  ounces of water, 0 ounces of soda, 0 ounces of sweet tea, 0 ounces of fruit juices, and 0 ounces of caffeine. We talked about not drinking any liquid calories. Physical Activity/Exercise    Patient is currently doing walking 3-5 x a week and golfing for activity. Today's power point on surviving the holidays also included tips on exercising. This included being creative during the holiday, walking stairs, mall walking, getting resistance bands. Patient was encouraged not to be afraid to excuse themselves from the table to go for a walk after they eat. Comments:     Behavior Modification    Reinforced behavior changes to make. Patient was encouraged to keep their emotions in check. Try to HALT and focus on whether they are eating out of hunger or if they are eating out of emotions. Other eating behaviors included surveying the buffet line before starting to fill up their plate. Patient was given a check off list and encouraged to monitor some of their eating behaviors, such as eating slowly, chewing their food thoroughly, and taking 20-30 minutes to eat a meal.    Weight loss surgery is important to the patient because:  States she is fearful of dying young*    Challenges or barriers that they may encounter with making changes after surgery are? Eliminating sweets will be her biggest challenge    Patient has  been to a support group meeting.       Non-Scale that patient set for next month include:  Try different exercise  Find a veggie dish for Ivania Lux MS RD  11/2/2021

## 2021-11-26 ENCOUNTER — OFFICE VISIT (OUTPATIENT)
Dept: SURGERY | Age: 36
End: 2021-11-26
Payer: COMMERCIAL

## 2021-11-26 VITALS
TEMPERATURE: 97.8 F | OXYGEN SATURATION: 97 % | SYSTOLIC BLOOD PRESSURE: 128 MMHG | DIASTOLIC BLOOD PRESSURE: 84 MMHG | RESPIRATION RATE: 18 BRPM | BODY MASS INDEX: 44.41 KG/M2 | HEART RATE: 80 BPM | WEIGHT: 293 LBS | HEIGHT: 68 IN

## 2021-11-26 PROCEDURE — 99214 OFFICE O/P EST MOD 30 MIN: CPT | Performed by: SURGERY

## 2021-11-26 NOTE — PROGRESS NOTES
Chief Complaint   Patient presents with    Follow-up     here for bariatric program pre-op visit     Pt ID confirmed    Weight Loss Metrics 11/26/2021 11/26/2021 9/30/2021 6/8/2021 6/8/2021 4/22/2021 11/16/2020   Pre op / Initial Wt 332 - - 336 - - -   Today's Wt - 332 lb 337 lb - 336 lb 334 lb 330 lb   BMI - 50.48 kg/m2 51.24 kg/m2 - 51.09 kg/m2 50.78 kg/m2 50.18 kg/m2   Ideal Body Wt 143 - - 142 - - -   Excess Body Wt 189 - - 194 - - -   Goal Wt 181 - - 181 - - -   Wt loss to date 0 - - 0 - - -   % Wt Loss 0 - - 0 - - -   80% .2 - - 155.2 - - -       Body mass index is 50.48 kg/m².

## 2021-11-26 NOTE — PROGRESS NOTES
Preop History and Physical Exam:    Scott Treviño is a 29 y.o. female who presents in follow-up after initial evaluation by Yue Holt nurse practitioner on June 21, 2021 for discussion of the surgical options over the definitive management of her super obesity. Patient at that time weighed 336 pounds and a 5 foot 8 inch frame with a body mass index of 51 with obesity related comorbidities of gastroesophageal reflux disease, stress urinary incontinence, polycystic ovarian syndrome, clinical obstructive sleep apnea and weight related arthropathyknees. The patient after discussing surgical options elected pursue laparoscopic potentially open Mckenna-en-Y gastric bypass to achieve definitive durable weight loss on a personal level. The patient presents in follow-up today after completing all multidisciplinary bariatric surgical multidisciplinary requirements for review of the diagnostic evaluation noting no new medical or surgical history. Allergies: Augmentin  Current medications: See medication list  Past medical history:  1. Super obesity with a body mass index of 50 with obesity related comorbidities consisting of gastroesophageal reflux disease, stress urinary incontinence, polycystic ovarian syndrome, clinical obstructive sleep apnea, plantar fasciitis, weight related arthropathyknees  Past surgical history:  1. Tonsillectomy/childhood  2. Uterine polypectomy 2020  Social history:  Tobacco: None  Alcohol: 1 ounce on a weekly basis  Family history:   Mother 63clinically severe obesity, obstructive sleep apnea, hypothyroidism  Father 61clinically severe obesity, hypertension, hypothyroidism,  Brother 39clinically severe obesity, obstructive sleep apnea      Past Medical History:   Diagnosis Date    Diabetes ([address]     Pre DM    Obesity (BMI 35.0-39.9 without comorbidity)     PCOS (polycystic ovarian syndrome)     Plantar fasciitis        Past Surgical History:   Procedure Laterality Date    HX GYN  2020    polypectomy uterus     HX TONSILLECTOMY         Current Outpatient Medications   Medication Sig Dispense Refill    aspirin delayed-release 81 mg tablet Take  by mouth daily.  (Patient not taking: Reported on 11/26/2021)         Allergies   Allergen Reactions    Augmentin [Amoxicillin-Pot Clavulanate] Nausea and Vomiting       Social History     Tobacco Use    Smoking status: Never Smoker    Smokeless tobacco: Never Used   Vaping Use    Vaping Use: Never used   Substance Use Topics    Alcohol use: Not Currently     Alcohol/week: 1.0 standard drink     Types: 1 Glasses of wine per week     Comment: socially    Drug use: No       Family History   Problem Relation Age of Onset    Thyroid Disease Mother     Arthritis-osteo Mother     Obesity Mother     Sleep Apnea Mother     Heart Attack Mother 36    Hypertension Father     Thyroid Disease Father     Sleep Apnea Brother     Obesity Brother        Review of Systems:  Positive in BOLD    CONST: Fever, weight loss, fatigue or chills  GI: Nausea, vomiting, abdominal pain, change in bowel habits, hematochezia, melena, and GERD   INTEG: Dermatitis, abnormal moles  HEENT: Recent changes in vision, vertigo, epistaxis, dysphagia and hoarseness  CV: Chest pain, palpitations, HTN, edema and varicosities  RESP: Cough, shortness of breath, wheezing, hemoptysis, snoring and reactive airway disease  : Hematuria, dysuria, frequency, urgency, nocturia and stress urinary incontinence   MS: Weakness, joint pain and arthritis  ENDO: Diabetes, thyroid disease, polyuria, polydipsia, polyphagia, poor wound healing, heat intolerance, cold intolerance  LYMPH/HEME: Anemia, bruising and history of blood transfusions  NEURO: Dizziness, headache, fainting, seizures and stroke  PSYCH: Anxiety and depression    Physical Exam    Visit Vitals  /84   Pulse 80   Temp 97.8 °F (36.6 °C)   Resp 18   Ht 5' 8\" (1.727 m)   Wt 150.6 kg (332 lb)   SpO2 97%   BMI 50.48 kg/m² General: Super obese 29-year-old female in no acute distress  Head: Normocephalic, atraumatic  Mouth: Clear, no overt lesions, oral mucosa pink and moist  Neck: Supple, no masses, no adenopathy or carotid bruits, trachea midline  Resp: Clear to auscultation bilaterally, now wheeze, rhonchi, or rales, excursions normal and symmetrical  Cardio: Regular rate and rhythm, no murmurs, clicks, gallops, or rubs. No edema or varicosities. Abdomen: Obese, soft, nontender, nondistended, normoactive bowel sounds, no hernias, no hepatosplenomegaly,  Extremities: Warm, well perfused, no tenderness or swelling, normal gait/station  Neuro: Sensation and strength grossly intact and symmetrical  Psych: Alert and oriented to person, place, and time. September 14, 2021, October 6, 2021 CBC, BMP, albumin, cholesterol panel, TSH, urinalysis, vitamin B1, B12, folate, iron, vitamin D, H. pylori, hemoglobin A1c: Glucose 107, vitamin D 23.7 with remainder laboratory profile within normal limits  November 2, 2021 Pap smear: No evidence of malignancy  October 22, 2021 chest x-ray: No active cardiopulmonary disease  June 14, 2021 upper GI: Normal  November 2, 2021 bariatric nutrition/planning: Concurring with procedures surgery  October 13, 2021 psychology/Velma: Concurrent with procedures surgery  September 21, 2021 EKG: Normal sinus rhythm with rate of 66 with sinus arrhythmia otherwise normal EKG    Impression:    Herman Wu is a 29 y.o. female with a body mass index of 50 with obesity related comorbidities of gastroesophageal reflux disease, stress urinary incontinence, polycystic ovarian syndrome, clinical obstructive sleep apnea, weight related arthropathyknees who would benefit from bariatric surgery. We've discussed the restrictive and malabsorptive nature of the gastric bypass and compared and contrasted with the sleeve gastrectomy.   The patient understands the likelihood of losing approximately 80% of their excess weight in 12 to 18 months. She also understands the risks including but not limited to bleeding, infection, need for reoperation, anastomotic ulcers, leaks and strictures, bowel obstruction secondary to adhesions and internal hernias, DVT, PE, heart attack, stroke, and death. Patient also understands risks of inadequate weight loss, excess weight loss, vitamin insufficiency, protein malnutrition, excess skin, and loss of hair. We have reviewed the components of a successful postoperative course including requirement for a high protein, low carbohydrate diet, 60 oz a day of zero calorie liquids, daily vitamin supplementation, daily exercise, regular follow-up, and participation in support groups. We have reviewed the preoperative liver shrinking clear liquid diet, as well as reviewed any medication changes required while on the clear liquid diet. In addition, the patient understands that all medications to be taken during the first 8 weeks postoperatively can be taken whole as long as the medication is approximately the size of a Cady 325 mg aspirin tablet in size. The patient further understands that it is his/her responsibility to review these and verify with their primary care doctor and pharmacist that all medications are of the appropriate size. We will schedule the patient for laparoscopic gastric bypass in the near future.

## 2021-12-06 ENCOUNTER — DOCUMENTATION ONLY (OUTPATIENT)
Dept: BARIATRICS/WEIGHT MGMT | Age: 36
End: 2021-12-06

## 2021-12-06 NOTE — PROGRESS NOTES
CLINICAL NUTRITION PRE-OPERATIVE EDUCATION    Patient's Name: Colt Lu   Age: 29 y.o. YOB: 1985   Sex: female    Education & Materials Provided:   - Soft Protein Diet Shopping List  -  Supplemental Resource Guide: MVI, B12, Calcium Citrate, Vitamin D, Vitamin B1,   and iron recommendations  - Protein Supplement Information  - Fluid Requirements/ No Straws  - No Caffeine or Carbonation   - No alcohol              - No Snacks or No Concentrated Sweets     - Exercising   - Support System at [facility] of Support Group meetings. Support System after surgery includes: x     - Key Diet Principles            - Addressed Current Habits/Changes to Make   - Patient has been educated on the liquid diet to begin 1 week prior to surgery. Patient understands the transition of the diet. Attendance of support group: Yes    Summary:  Patient has completed the required amount of visits with the Registered Dietitian. During these nutrition visits, we focused on dietary changes, behavior changes, and the importance of establishing an exercise routine. The diet protocol that patient was prescribed emphasized on low carbohydrates (less than 100 grams per day prior to surgery and 60-80 grams of protein per day). At today's session, patient was educated on the post-op diet protocol. Patient understands the importance of keeping total fat and sugar less than 3 grams per serving. Patient is aware of the transition of the diet stages and is aware that they will be on clear liquids for 7days, followed by soft protein for 5 weeks. Patient understands the body needs ~ 60-70 grams of protein per day. During the liquid phase, patient will need 60 grams of protein from shakes. Once eating soft protein, patient will only need 1 shake per day. Patient is aware of the importance of the vitamins and protein drinks. We spent a lot of time talking about the vitamins.   Patient understands the SUBJECTIVE: Mahogany Whitman is a 49 year old female who presents with bilateral shoulder pain, left worse than right. Also has history of polyarthralgias (see 1/27/2020 note w/ Dr. Patricio).     As for shoulder pain: Last seen 1/22/2020 for this specifically. See Dr. Nicholson's note (Fall mid Jan, hurt clavicle, x-ray negative). States it wakes her up every night and feels achey throughout her entire arm. Taking 500mg naproxen each morning, which helps a little bit. It's hard for her to take it later in the day because she is busy working and doesn't have time to stop and eat so she doesn't take it on an empty stomach. I recommended she take one right before bed with milk, which she was open to, but states she really doesn't drink much milk--but she tries to drink at least two cups per day. Right shoulder worse during sleeping. Left shoulder worse with working.     Has history of anemia, and asks for lab results today. Encouraged by Hb 13.2 from 9-10 at baseline. She said she's been eating foods rich in iron such as spinach and dates. Feels her energy has been better than before.     She's also curious about Vitamin D level, as she has history of vitamin d deficiency. Last level 18 on 6/14/2017. Started taking OTC vitamin D yesterday. Previously had bone pain, which she thought was related to Vit D level.     PAST MEDICAL, SOCIAL, SURGICAL AND FAMILY HISTORY: She  has a past medical history of Gestational diabetes.  She  has a past surgical history that includes no history of surgery; Dilation and curettage, operative hysteroscopy with morcellator, combined (N/A, 5/2/2018); and Insert intrauterine device (N/A, 5/2/2018).  Her family history is not on file.  She reports that she has never smoked. She has never used smokeless tobacco. She reports that she does not drink alcohol or use drugs.    ALLERGIES: She has No Known Allergies.    CURRENT MEDICATIONS: She has a current medication list which includes the  "following prescription(s): vitamin d3, naproxen, vitamin d3, ciprofloxacin, docusate sodium, and ferrous sulfate.     REVIEW OF SYSTEMS: See HPI.    EXAM:  BP 92/64   Pulse 79   Temp 97.9  F (36.6  C) (Oral)   Resp 16   Ht 1.676 m (5' 6\")   Wt 74.3 kg (163 lb 12.8 oz)   LMP 01/25/2020 (Exact Date)   SpO2 98%   Breastfeeding No   BMI 26.44 kg/m    CONSTITUTIONIAL: healthy, alert and no distress  HEAD: Normocephalic. No masses, lesions, tenderness or abnormalities  MUSCULOSKELETAL:  Subscap lift-off-- reduced strength bilaterally, reduced ROM to level L4.   Tenderness at biceps tendon bilaterally. Tender along anterior cuff line bilaterally. Tender at trapezius bilaterally.  No tenderness of clavicle, AC joint, c-spine.   Internal and external rotation and abduction intact.  Empty can positive on left. Reduced strength supraspinatus bilaterally-- left 4/5, right 5-/5.   Segovia negative.   Neers right shoulder slightly positive.      ASSESSMENT/PLAN:  Osteoarthritis bilateral shoulders.   Suspect bilateral impingement 2/2 rotator cuff tendinopathy vs possible tear; overall, left worse than right clinically/on exam.   Vitamin D insufficiency.  Hx microcytic anemia-    Recommend starting with conservative management including:   - Take naproxen 500mg BID PRN (okay to take at night with glass of milk)  - Referred to PT for bilateral shoulder work for suspected rotator cuff dysfunction  - Encouraged attending group exercise classes at [facility] (currently going to gym on weekends, encouraged 1-2 times during week as well)  - If no improvement in 4 weeks with physical therapy, ice, naproxen, and increased mobility, can consider steroid injections vs advanced imaging    Patient also has history vit D deficiency and asked to have level re-checked. Level 24, at lower limit of normal. Will recommend re-initiationg supplementation at next visit-- 2,000 international unit(s) daily.     Discussed lab results drawn at last " importance of being compliant with the diet protocol and the complications and risks that can occur if they are non-compliant with the nutritional protocol and non-compliant with the vitamins. Patient has also been educated on the pre-op liquid diet for 1 week. Patient understands that failure to comply in pre-op liquid diet could result in surgery being canceled. Patient's 6 week post-op nutrition appointment has been scheduled. At this 6 week visit, RD will assess how patient is tolerating soft protein and advance to vegetables, if tolerating soft protein without difficulty. Patient will also see RD again at 9 months post-op. This visit will assess patient's compliance with current protocol, including diet, vitamins, protein shakes, and exercise. Post-op diet guidelines will be reinforced. RD is available for questions and to meet with patient outside of the 6 week and 9 month post-op visit. Ok to proceed.      Candidate for surgery: Yes  Re-evaluation Date:     Jessica Byrne 87 RD  12/6/2021 visit showing improvement in microcytic anemia- no longer anemic. Will continue to monitor PRN.     Payal Johnson MD , PGY-1, [facility]      Patient seen, evaluated and discussed with the resident. I have verified the content of the note, which accurately reflects my assessment of the patient and the plan of care.   Supervising Physician:  Sonya Collazo MD

## 2021-12-07 ENCOUNTER — HOSPITAL ENCOUNTER (OUTPATIENT)
Dept: BARIATRICS/WEIGHT MGMT | Age: 36
Discharge: HOME | End: 2021-12-07

## 2021-12-22 RX ORDER — CHOLECALCIFEROL TAB 125 MCG (5000 UNIT) 125 MCG
5000 TAB ORAL DAILY
COMMUNITY

## 2021-12-22 NOTE — PERIOP NOTES
PRE-SURGICAL INSTRUCTIONS        Patient's Name:  Lisseth Macias      GMNFM'E Date:  12/22/2021            Covid Testing Date and Time:    Surgery Date:  1/5/2022                1. Do NOT eat or drink anything, including candy, gum, or ice chips after midnight on 1/4/2022, unless you have specific instructions from your surgeon or anesthesia provider to do so.  2. You may brush your teeth before coming to the hospital.  3. No smoking 24 hours prior to the day of surgery. 4. No alcohol 24 hours prior to the day of surgery. 5. No recreational drugs for one week prior to the day of surgery. 6. Leave all valuables, including money/purse, at home. 7. Remove all jewelry, nail polish, acrylic nails, and makeup (including mascara); no lotions powders, deodorant, or perfume/cologne/after shave on the skin. 8. Follow instruction for Hibiclens washes and CHG wipes from surgeon's office. 9. Glasses/contact lenses and dentures may be worn to the hospital.  They will be removed prior to surgery. 10. Call your doctor if symptoms of a cold or illness develop within 24-48 hours prior to your surgery. 11.  If you are having an outpatient procedure, please make arrangements for a responsible ADULT TO [address] [place] [address] and stay with you for 24 hours after your surgery. 12. ONE VISITOR in the hospital at this time for outpatient procedures. Exceptions may be made for surgical admissions, per nursing unit guidelines      Special Instructions:      Bring list of CURRENT medications. Bring any pertinent legal medical records. On the day of surgery, come in the main entrance of [facility]. Let the  at the desk know you are there for surgery. A staff member will come escort you to the surgical area on the second floor.     If you have any questions or concerns, please do not hesitate to call:     (Prior to the day of surgery) [facility] department:  [phone]   (Day of surgery) Pre-Op department:  [phone]    These surgical instructions were reviewed with patient during the PAT phone call.

## 2022-01-04 ENCOUNTER — ANESTHESIA EVENT (OUTPATIENT)
Dept: SURGERY | Age: 37
End: 2022-01-04
Payer: COMMERCIAL

## 2022-01-05 ENCOUNTER — ANESTHESIA (OUTPATIENT)
Dept: SURGERY | Age: 37
End: 2022-01-05
Payer: COMMERCIAL

## 2022-01-05 ENCOUNTER — HOSPITAL ENCOUNTER (OUTPATIENT)
Age: 37
Discharge: HOME | End: 2022-01-05
Attending: SURGERY | Admitting: SURGERY
Payer: COMMERCIAL

## 2022-01-05 VITALS
DIASTOLIC BLOOD PRESSURE: 82 MMHG | BODY MASS INDEX: 44.41 KG/M2 | HEIGHT: 68 IN | OXYGEN SATURATION: 97 % | WEIGHT: 293 LBS | HEART RATE: 89 BPM | SYSTOLIC BLOOD PRESSURE: 115 MMHG | RESPIRATION RATE: 20 BRPM | TEMPERATURE: 98 F

## 2022-01-05 PROBLEM — E66.01 MORBID OBESITY WITH BMI OF 50.0-59.9, ADULT (HCC): Status: ACTIVE | Noted: 2022-01-05

## 2022-01-05 LAB
COVID-19 RAPID TEST, COVR: DETECTED
HCG UR QL: NEGATIVE
SOURCE, COVRS: ABNORMAL

## 2022-01-05 PROCEDURE — 74011000250 HC RX REV CODE- 250: Performed by: NURSE ANESTHETIST, CERTIFIED REGISTERED

## 2022-01-05 PROCEDURE — 81025 URINE PREGNANCY TEST: CPT

## 2022-01-05 PROCEDURE — 74011250636 HC RX REV CODE- 250/636: Performed by: NURSE ANESTHETIST, CERTIFIED REGISTERED

## 2022-01-05 PROCEDURE — 74011250636 HC RX REV CODE- 250/636: Performed by: SURGERY

## 2022-01-05 PROCEDURE — 87635 SARS-COV-2 COVID-19 AMP PRB: CPT

## 2022-01-05 RX ORDER — DEXTROSE 50 % IN WATER (D50W) INTRAVENOUS SYRINGE
25-50 AS NEEDED
Status: CANCELLED | OUTPATIENT
Start: 2022-01-05

## 2022-01-05 RX ORDER — LIDOCAINE HYDROCHLORIDE 10 MG/ML
0.1 INJECTION, SOLUTION EPIDURAL; INFILTRATION; INTRACAUDAL; PERINEURAL AS NEEDED
Status: DISCONTINUED | OUTPATIENT
Start: 2022-01-05 | End: 2022-01-05 | Stop reason: HOSPADM

## 2022-01-05 RX ORDER — FENTANYL CITRATE 50 UG/ML
25 INJECTION, SOLUTION INTRAMUSCULAR; INTRAVENOUS AS NEEDED
Status: CANCELLED | OUTPATIENT
Start: 2022-01-05

## 2022-01-05 RX ORDER — ENOXAPARIN SODIUM 100 MG/ML
40 INJECTION SUBCUTANEOUS ONCE
Status: COMPLETED | OUTPATIENT
Start: 2022-01-05 | End: 2022-01-05

## 2022-01-05 RX ORDER — SODIUM CHLORIDE, SODIUM LACTATE, POTASSIUM CHLORIDE, CALCIUM CHLORIDE 600; 310; 30; 20 MG/100ML; MG/100ML; MG/100ML; MG/100ML
150 INJECTION, SOLUTION INTRAVENOUS CONTINUOUS
Status: DISCONTINUED | OUTPATIENT
Start: 2022-01-05 | End: 2022-01-05 | Stop reason: HOSPADM

## 2022-01-05 RX ORDER — ONDANSETRON 2 MG/ML
4 INJECTION INTRAMUSCULAR; INTRAVENOUS
Status: CANCELLED | OUTPATIENT
Start: 2022-01-05 | End: 2022-01-06

## 2022-01-05 RX ORDER — MAGNESIUM SULFATE 100 %
4 CRYSTALS MISCELLANEOUS AS NEEDED
Status: CANCELLED | OUTPATIENT
Start: 2022-01-05

## 2022-01-05 RX ORDER — INSULIN LISPRO 100 [IU]/ML
INJECTION, SOLUTION INTRAVENOUS; SUBCUTANEOUS ONCE
Status: DISCONTINUED | OUTPATIENT
Start: 2022-01-05 | End: 2022-01-05 | Stop reason: HOSPADM

## 2022-01-05 RX ORDER — SODIUM CHLORIDE, SODIUM LACTATE, POTASSIUM CHLORIDE, CALCIUM CHLORIDE 600; 310; 30; 20 MG/100ML; MG/100ML; MG/100ML; MG/100ML
50 INJECTION, SOLUTION INTRAVENOUS CONTINUOUS
Status: CANCELLED | OUTPATIENT
Start: 2022-01-05

## 2022-01-05 RX ORDER — SODIUM CHLORIDE, SODIUM LACTATE, POTASSIUM CHLORIDE, CALCIUM CHLORIDE 600; 310; 30; 20 MG/100ML; MG/100ML; MG/100ML; MG/100ML
25 INJECTION, SOLUTION INTRAVENOUS CONTINUOUS
Status: DISCONTINUED | OUTPATIENT
Start: 2022-01-05 | End: 2022-01-05 | Stop reason: HOSPADM

## 2022-01-05 RX ORDER — FAMOTIDINE 20 MG/1
20 TABLET, FILM COATED ORAL ONCE
Status: DISCONTINUED | OUTPATIENT
Start: 2022-01-05 | End: 2022-01-05 | Stop reason: SDUPTHER

## 2022-01-05 RX ORDER — INSULIN LISPRO 100 [IU]/ML
INJECTION, SOLUTION INTRAVENOUS; SUBCUTANEOUS ONCE
Status: CANCELLED | OUTPATIENT
Start: 2022-01-05 | End: 2022-01-05

## 2022-01-05 RX ORDER — HYDROMORPHONE HYDROCHLORIDE 2 MG/ML
0.5 INJECTION, SOLUTION INTRAMUSCULAR; INTRAVENOUS; SUBCUTANEOUS
Status: CANCELLED | OUTPATIENT
Start: 2022-01-05

## 2022-01-05 RX ADMIN — ENOXAPARIN SODIUM 40 MG: 100 INJECTION SUBCUTANEOUS at 08:56

## 2022-01-05 RX ADMIN — FAMOTIDINE 20 MG: 10 INJECTION INTRAVENOUS at 08:56

## 2022-01-05 RX ADMIN — SODIUM CHLORIDE, SODIUM LACTATE, POTASSIUM CHLORIDE, AND CALCIUM CHLORIDE 150 ML/HR: 600; 310; 30; 20 INJECTION, SOLUTION INTRAVENOUS at 08:56

## 2022-01-05 NOTE — ANESTHESIA PREPROCEDURE EVALUATION
Relevant Problems   ENDOCRINE   (+) Morbid obesity ([facility] [address]       Anesthetic History   No history of anesthetic complications            Review of Systems / Medical History  Patient summary reviewed and pertinent labs reviewed    Pulmonary  Within defined limits                 Neuro/Psych   Within defined limits           Cardiovascular                  Exercise tolerance: >4 METS     GI/Hepatic/Renal  Within defined limits              Endo/Other    Diabetes (Prediabetic)    Morbid obesity     Other Findings   Comments: PCOS           Physical Exam    Airway  Mallampati: III  TM Distance: 4 - 6 cm  Neck ROM: normal range of motion   Mouth opening: Normal     Cardiovascular  Regular rate and rhythm,  S1 and S2 normal,  no murmur, click, rub, or gallop             Dental  No notable dental hx       Pulmonary  Breath sounds clear to auscultation               Abdominal  GI exam deferred       Other Findings            Anesthetic Plan    ASA: 3            Induction: Intravenous  Anesthetic plan and risks discussed with: Patient

## 2022-01-05 NOTE — H&P
Office Visit    11/26/2021  [facility] [place], [place]    General Surgery  BMI 50.0-59.9, adult [facility])    Dx  Follow-up ; Referred by Unknown    Reason for Visit       Progress Notes  Bosie Blizzard, DO (Physician) [facility]     Preop History and Physical Exam:     Desire Bazan is a 29 y.o. female who presents in follow-up after initial evaluation by Bentley Kumar nurse practitioner on June 21, 2021 for discussion of the surgical options over the definitive management of her super obesity. Patient at that time weighed 336 pounds and a 5 foot 8 inch frame with a body mass index of 51 with obesity related comorbidities of gastroesophageal reflux disease, stress urinary incontinence, polycystic ovarian syndrome, clinical obstructive sleep apnea and weight related arthropathyknees. The patient after discussing surgical options elected pursue laparoscopic potentially open Mckenna-en-Y gastric bypass to achieve definitive durable weight loss on a personal level. The patient presents in follow-up today after completing all multidisciplinary bariatric surgical multidisciplinary requirements for review of the diagnostic evaluation noting no new medical or surgical history. Allergies: Augmentin  Current medications: See medication list  Past medical history:  1. Super obesity with a body mass index of 50 with obesity related comorbidities consisting of gastroesophageal reflux disease, stress urinary incontinence, polycystic ovarian syndrome, clinical obstructive sleep apnea, plantar fasciitis, weight related arthropathyknees  Past surgical history:  1. Tonsillectomy/childhood  2. Uterine polypectomy 2020  Social history:  Tobacco: None  Alcohol: 1 ounce on a weekly basis  Family history:   Mother 63clinically severe obesity, obstructive sleep apnea, hypothyroidism  Father 61clinically severe obesity, hypertension, hypothyroidism,  Brother 39clinically severe obesity, obstructive sleep apnea             Past Medical History:   Diagnosis Date    Diabetes ([address]       Pre DM    Obesity (BMI 35.0-39.9 without comorbidity)      PCOS (polycystic ovarian syndrome)      Plantar fasciitis                 Past Surgical History:   Procedure Laterality Date    HX GYN   2020     polypectomy uterus     HX TONSILLECTOMY                    Current Outpatient Medications   Medication Sig Dispense Refill    aspirin delayed-release 81 mg tablet Take  by mouth daily. (Patient not taking: Reported on 11/26/2021)                  Allergies   Allergen Reactions    Augmentin [Amoxicillin-Pot Clavulanate] Nausea and Vomiting         Social History            Tobacco Use    Smoking status: Never Smoker    Smokeless tobacco: Never Used   Vaping Use    Vaping Use: Never used   Substance Use Topics    Alcohol use: Not Currently       Alcohol/week: 1.0 standard drink       Types: 1 Glasses of wine per week       Comment: socially    Drug use:  No               Family History   Problem Relation Age of Onset    Thyroid Disease Mother      Arthritis-osteo Mother      Obesity Mother      Sleep Apnea Mother      Heart Attack Mother 36    Hypertension Father      Thyroid Disease Father      Sleep Apnea Brother      Obesity Brother           Review of Systems:  Positive in BOLD     CONST: Fever, weight loss, fatigue or chills  GI: Nausea, vomiting, abdominal pain, change in bowel habits, hematochezia, melena, and GERD   INTEG: Dermatitis, abnormal moles  HEENT: Recent changes in vision, vertigo, epistaxis, dysphagia and hoarseness  CV: Chest pain, palpitations, HTN, edema and varicosities  RESP: Cough, shortness of breath, wheezing, hemoptysis, snoring and reactive airway disease  : Hematuria, dysuria, frequency, urgency, nocturia and stress urinary incontinence   MS: Weakness, joint pain and arthritis  ENDO: Diabetes, thyroid disease, polyuria, polydipsia, polyphagia, poor wound healing, heat intolerance, cold intolerance  LYMPH/HEME: Anemia, bruising and history of blood transfusions  NEURO: Dizziness, headache, fainting, seizures and stroke  PSYCH: Anxiety and depression     Physical Exam     Visit Vitals  /84   Pulse 80   Temp 97.8 °F (36.6 °C)   Resp 18   Ht 5' 8\" (1.727 m)   Wt 150.6 kg (332 lb)   SpO2 97%   BMI 50.48 kg/m²            General: Super obese 72-year-old female in no acute distress  Head: Normocephalic, atraumatic  Mouth: Clear, no overt lesions, oral mucosa pink and moist  Neck: Supple, no masses, no adenopathy or carotid bruits, trachea midline  Resp: Clear to auscultation bilaterally, now wheeze, rhonchi, or rales, excursions normal and symmetrical  Cardio: Regular rate and rhythm, no murmurs, clicks, gallops, or rubs. No edema or varicosities.   Abdomen: Obese, soft, nontender, nondistended, normoactive bowel sounds, no hernias, no hepatosplenomegaly,  Extremities: Warm, well perfused, no tenderness or swelling, normal gait/station  Neuro: Sensation and strength grossly intact and symmetrical  Psych: Alert and oriented to person, place, and time.        September 14, 2021, October 6, 2021 CBC, BMP, albumin, cholesterol panel, TSH, urinalysis, vitamin B1, B12, folate, iron, vitamin D, H. pylori, hemoglobin A1c: Glucose 107, vitamin D 23.7 with remainder laboratory profile within normal limits  November 2, 2021 Pap smear: No evidence of malignancy  October 22, 2021 chest x-ray: No active cardiopulmonary disease  June 14, 2021 upper GI: Normal  November 2, 2021 bariatric nutrition/planning: Concurring with procedures surgery  October 13, 2021 psychology/Velma: Concurrent with procedures surgery  September 21, 2021 EKG: Normal sinus rhythm with rate of 66 with sinus arrhythmia otherwise normal EKG     Impression:     Saverio Gosselin is a 29 y.o. female with a body mass index of 50 with obesity related comorbidities of gastroesophageal reflux disease, stress urinary incontinence, polycystic ovarian syndrome, clinical obstructive sleep apnea, weight related arthropathyknees who would benefit from bariatric surgery. We've discussed the restrictive and malabsorptive nature of the gastric bypass and compared and contrasted with the sleeve gastrectomy. The patient understands the likelihood of losing approximately 80% of their excess weight in 12 to 18 months. She also understands the risks including but not limited to bleeding, infection, need for reoperation, anastomotic ulcers, leaks and strictures, bowel obstruction secondary to adhesions and internal hernias, DVT, PE, heart attack, stroke, and death. Patient also understands risks of inadequate weight loss, excess weight loss, vitamin insufficiency, protein malnutrition, excess skin, and loss of hair. We have reviewed the components of a successful postoperative course including requirement for a high protein, low carbohydrate diet, 60 oz a day of zero calorie liquids, daily vitamin supplementation, daily exercise, regular follow-up, and participation in support groups. We have reviewed the preoperative liver shrinking clear liquid diet, as well as reviewed any medication changes required while on the clear liquid diet. In addition, the patient understands that all medications to be taken during the first 8 weeks postoperatively can be taken whole as long as the medication is approximately the size of a Cady 325 mg aspirin tablet in size. The patient further understands that it is his/her responsibility to review these and verify with their primary care doctor and pharmacist that all medications are of the appropriate size. We will schedule the patient for laparoscopic gastric bypass in the near future. The above history and physical examination was reviewed. There is been no interval medical or surgical history.   The post laparoscopic gastric bypass to achieve definitive durable weight loss on a personal level with expected resolution of obesity related comorbidities was reviewed. The risk benefits of operating versus not potential tenderness potential complications up to including death were discussed.   The patient noted understanding discussion wished to proceed    Magdaleno Wray DO, FACS

## 2022-01-05 NOTE — DISCHARGE INSTRUCTIONS

## 2022-01-19 NOTE — PERIOP NOTES
PRE-SURGICAL INSTRUCTIONS        Patient's Name:  Rosa Abdi      CEFDK'B Date:  1/19/2022            Covid Testing Date and Time: not required, positive 1/5/22    Surgery Date:  1/31/2022                1. Do NOT eat or drink anything, including candy, gum, or ice chips after midnight on 1/31, unless you have specific instructions from your surgeon or anesthesia provider to do so.  2. You may brush your teeth before coming to the hospital.  3. No smoking 24 hours prior to the day of surgery. 4. No alcohol 24 hours prior to the day of surgery. 5. No recreational drugs for one week prior to the day of surgery. 6. Leave all valuables, including money/purse, at home. 7. Remove all jewelry, nail polish, acrylic nails, and makeup (including mascara); no lotions powders, deodorant, or perfume/cologne/after shave on the skin. 8. Follow instruction for Hibiclens washes and CHG wipes from surgeon's office. 9. Glasses/contact lenses and dentures may be worn to the hospital.  They will be removed prior to surgery. 10. Call your doctor if symptoms of a cold or illness develop within 24-48 hours prior to your surgery. 11.  If you are having an outpatient procedure, please make arrangements for a responsible ADULT TO [address] [place] [address] and stay with you for 24 hours after your surgery. 12. ONE VISITOR in the hospital at this time for outpatient procedures. Exceptions may be made for surgical admissions, per nursing unit guidelines      Special Instructions:      Bring list of CURRENT medications. Bring any pertinent legal medical records. Take these medications the morning of surgery with a sip of water:  As directed by MD  Follow physician instructions about stopping anticoagulants. On the day of surgery, come in the main entrance of [facility]. Let the  at the desk know you are there for surgery.   A staff member will come escort you to the surgical area on the second floor. If you have any questions or concerns, please do not hesitate to call:     (Prior to the day of surgery) PAT department:  [phone]   (Day of surgery) Pre-Op department:  [phone]    These surgical instructions were reviewed with Kiya Cheng during the [facility] phone call.

## 2022-01-28 ENCOUNTER — ANESTHESIA EVENT (OUTPATIENT)
Dept: SURGERY | Age: 37
DRG: 621 | End: 2022-01-28
Payer: COMMERCIAL

## 2022-01-31 ENCOUNTER — HOSPITAL ENCOUNTER (INPATIENT)
Age: 37
LOS: 1 days | Discharge: HOME | DRG: 621 | End: 2022-02-01
Attending: SURGERY | Admitting: SURGERY
Payer: COMMERCIAL

## 2022-01-31 ENCOUNTER — ANESTHESIA (OUTPATIENT)
Dept: SURGERY | Age: 37
DRG: 621 | End: 2022-01-31
Payer: COMMERCIAL

## 2022-01-31 DIAGNOSIS — E66.01 MORBID OBESITY WITH BMI OF 50.0-59.9, ADULT (HCC): ICD-10-CM

## 2022-01-31 DIAGNOSIS — G89.18 POST-OP PAIN: Primary | ICD-10-CM

## 2022-01-31 DIAGNOSIS — K76.0 HEPATIC STEATOSIS: ICD-10-CM

## 2022-01-31 LAB — HCG UR QL: NEGATIVE

## 2022-01-31 PROCEDURE — 0FB24ZX EXCISION OF LEFT LOBE LIVER, PERCUTANEOUS ENDOSCOPIC APPROACH, DIAGNOSTIC: ICD-10-PCS | Performed by: SURGERY

## 2022-01-31 PROCEDURE — 81025 URINE PREGNANCY TEST: CPT

## 2022-01-31 PROCEDURE — 77030009851 HC PCH RTVR ENDOSC AMR -B: Performed by: SURGERY

## 2022-01-31 PROCEDURE — 77030009932 HC PRB FT CTRL J&J -B: Performed by: SURGERY

## 2022-01-31 PROCEDURE — 00797 ANES IPER UPR ABD GSTR PX MO: CPT | Performed by: ANESTHESIOLOGY

## 2022-01-31 PROCEDURE — 77030010031 HC SCIS ENDOSC MPLR J&J -C: Performed by: SURGERY

## 2022-01-31 PROCEDURE — 74011000272 HC RX REV CODE- 272: Performed by: SURGERY

## 2022-01-31 PROCEDURE — 74011000250 HC RX REV CODE- 250: Performed by: NURSE ANESTHETIST, CERTIFIED REGISTERED

## 2022-01-31 PROCEDURE — 77030031139 HC SUT VCRL2 J&J -A: Performed by: SURGERY

## 2022-01-31 PROCEDURE — 88313 SPECIAL STAINS GROUP 2: CPT

## 2022-01-31 PROCEDURE — 77030040922 HC BLNKT HYPOTHRM STRY -A: Performed by: SURGERY

## 2022-01-31 PROCEDURE — 88307 TISSUE EXAM BY PATHOLOGIST: CPT

## 2022-01-31 PROCEDURE — 77030019605: Performed by: SURGERY

## 2022-01-31 PROCEDURE — 74011000250 HC RX REV CODE- 250: Performed by: SURGERY

## 2022-01-31 PROCEDURE — 74011250637 HC RX REV CODE- 250/637: Performed by: SURGERY

## 2022-01-31 PROCEDURE — 74011250636 HC RX REV CODE- 250/636: Performed by: ANESTHESIOLOGY

## 2022-01-31 PROCEDURE — 77030008756 HC TU IRR SUC STRY -B: Performed by: SURGERY

## 2022-01-31 PROCEDURE — 74011000250 HC RX REV CODE- 250: Performed by: ANESTHESIOLOGY

## 2022-01-31 PROCEDURE — 77030002996 HC SUT SLK J&J -A: Performed by: SURGERY

## 2022-01-31 PROCEDURE — 47379 UNLISTED LAPS PX LIVER: CPT | Performed by: SURGERY

## 2022-01-31 PROCEDURE — 77030008598 HC TRCR ENDOSC BLDLS J&J -B: Performed by: SURGERY

## 2022-01-31 PROCEDURE — 0D164ZA BYPASS STOMACH TO JEJUNUM, PERCUTANEOUS ENDOSCOPIC APPROACH: ICD-10-PCS | Performed by: SURGERY

## 2022-01-31 PROCEDURE — 76060000035 HC ANESTHESIA 2 TO 2.5 HR: Performed by: SURGERY

## 2022-01-31 PROCEDURE — 77030040361 HC SLV COMPR DVT MDII -B: Performed by: SURGERY

## 2022-01-31 PROCEDURE — C9290 INJ, BUPIVACAINE LIPOSOME: HCPCS | Performed by: SURGERY

## 2022-01-31 PROCEDURE — 77030008603 HC TRCR ENDOSC EPATH J&J -C: Performed by: SURGERY

## 2022-01-31 PROCEDURE — 76210000016 HC OR PH I REC 1 TO 1.5 HR: Performed by: SURGERY

## 2022-01-31 PROCEDURE — 77030018836 HC SOL IRR NACL ICUM -A: Performed by: SURGERY

## 2022-01-31 PROCEDURE — 77030002933 HC SUT MCRYL J&J -A: Performed by: SURGERY

## 2022-01-31 PROCEDURE — 74011250636 HC RX REV CODE- 250/636: Performed by: SURGERY

## 2022-01-31 PROCEDURE — 65270000029 HC RM PRIVATE

## 2022-01-31 PROCEDURE — 43645 LAP GASTR BYPASS INCL SMLL I: CPT | Performed by: SURGERY

## 2022-01-31 PROCEDURE — 76010000131 HC OR TIME 2 TO 2.5 HR: Performed by: SURGERY

## 2022-01-31 PROCEDURE — 77030036732 HC RELD STPLR VASC J&J -F: Performed by: SURGERY

## 2022-01-31 PROCEDURE — 74011250636 HC RX REV CODE- 250/636: Performed by: NURSE ANESTHETIST, CERTIFIED REGISTERED

## 2022-01-31 PROCEDURE — 77030009968 HC RELD STPLR ENDOSC J&J -D: Performed by: SURGERY

## 2022-01-31 PROCEDURE — 77030008437 HC REINF STRP REINF SEMGD WLGO -C: Performed by: SURGERY

## 2022-01-31 PROCEDURE — 2709999900 HC NON-CHARGEABLE SUPPLY: Performed by: SURGERY

## 2022-01-31 PROCEDURE — 77030027876 HC STPLR ENDOSC FLX PWR J&J -G1: Performed by: SURGERY

## 2022-01-31 PROCEDURE — 77030011808 HC STPLR ENDOSCOPIC J&J -D: Performed by: SURGERY

## 2022-01-31 RX ORDER — NALOXONE HYDROCHLORIDE 0.4 MG/ML
0.2 INJECTION, SOLUTION INTRAMUSCULAR; INTRAVENOUS; SUBCUTANEOUS AS NEEDED
Status: DISCONTINUED | OUTPATIENT
Start: 2022-01-31 | End: 2022-01-31 | Stop reason: HOSPADM

## 2022-01-31 RX ORDER — KETOROLAC TROMETHAMINE 15 MG/ML
15 INJECTION, SOLUTION INTRAMUSCULAR; INTRAVENOUS
Status: DISCONTINUED | OUTPATIENT
Start: 2022-01-31 | End: 2022-02-01 | Stop reason: HOSPADM

## 2022-01-31 RX ORDER — DEXAMETHASONE SODIUM PHOSPHATE 4 MG/ML
INJECTION, SOLUTION INTRA-ARTICULAR; INTRALESIONAL; INTRAMUSCULAR; INTRAVENOUS; SOFT TISSUE AS NEEDED
Status: DISCONTINUED | OUTPATIENT
Start: 2022-01-31 | End: 2022-01-31 | Stop reason: HOSPADM

## 2022-01-31 RX ORDER — ENOXAPARIN SODIUM 100 MG/ML
40 INJECTION SUBCUTANEOUS EVERY 24 HOURS
Status: DISCONTINUED | OUTPATIENT
Start: 2022-02-01 | End: 2022-02-01 | Stop reason: HOSPADM

## 2022-01-31 RX ORDER — HYDROMORPHONE HYDROCHLORIDE 2 MG/ML
0.2 INJECTION, SOLUTION INTRAMUSCULAR; INTRAVENOUS; SUBCUTANEOUS AS NEEDED
Status: DISCONTINUED | OUTPATIENT
Start: 2022-01-31 | End: 2022-01-31 | Stop reason: HOSPADM

## 2022-01-31 RX ORDER — SODIUM CHLORIDE, SODIUM LACTATE, POTASSIUM CHLORIDE, CALCIUM CHLORIDE 600; 310; 30; 20 MG/100ML; MG/100ML; MG/100ML; MG/100ML
150 INJECTION, SOLUTION INTRAVENOUS CONTINUOUS
Status: DISCONTINUED | OUTPATIENT
Start: 2022-01-31 | End: 2022-01-31 | Stop reason: HOSPADM

## 2022-01-31 RX ORDER — ONDANSETRON 2 MG/ML
INJECTION INTRAMUSCULAR; INTRAVENOUS AS NEEDED
Status: DISCONTINUED | OUTPATIENT
Start: 2022-01-31 | End: 2022-01-31 | Stop reason: HOSPADM

## 2022-01-31 RX ORDER — HYDROMORPHONE HYDROCHLORIDE 1 MG/ML
1 INJECTION, SOLUTION INTRAMUSCULAR; INTRAVENOUS; SUBCUTANEOUS
Status: DISCONTINUED | OUTPATIENT
Start: 2022-01-31 | End: 2022-02-01 | Stop reason: HOSPADM

## 2022-01-31 RX ORDER — DEXTROSE 50 % IN WATER (D50W) INTRAVENOUS SYRINGE
25-50 AS NEEDED
Status: DISCONTINUED | OUTPATIENT
Start: 2022-01-31 | End: 2022-01-31 | Stop reason: HOSPADM

## 2022-01-31 RX ORDER — SODIUM CHLORIDE, SODIUM LACTATE, POTASSIUM CHLORIDE, CALCIUM CHLORIDE 600; 310; 30; 20 MG/100ML; MG/100ML; MG/100ML; MG/100ML
75 INJECTION, SOLUTION INTRAVENOUS CONTINUOUS
Status: DISCONTINUED | OUTPATIENT
Start: 2022-01-31 | End: 2022-01-31 | Stop reason: HOSPADM

## 2022-01-31 RX ORDER — MIDAZOLAM HYDROCHLORIDE 1 MG/ML
INJECTION, SOLUTION INTRAMUSCULAR; INTRAVENOUS AS NEEDED
Status: DISCONTINUED | OUTPATIENT
Start: 2022-01-31 | End: 2022-01-31 | Stop reason: HOSPADM

## 2022-01-31 RX ORDER — ACETAMINOPHEN 325 MG/1
650 TABLET ORAL EVERY 6 HOURS
Status: DISCONTINUED | OUTPATIENT
Start: 2022-01-31 | End: 2022-02-01 | Stop reason: HOSPADM

## 2022-01-31 RX ORDER — KETAMINE HCL 50MG/ML(1)
SYRINGE (ML) INTRAVENOUS AS NEEDED
Status: DISCONTINUED | OUTPATIENT
Start: 2022-01-31 | End: 2022-01-31 | Stop reason: HOSPADM

## 2022-01-31 RX ORDER — ONDANSETRON 2 MG/ML
4 INJECTION INTRAMUSCULAR; INTRAVENOUS ONCE
Status: COMPLETED | OUTPATIENT
Start: 2022-01-31 | End: 2022-01-31

## 2022-01-31 RX ORDER — ACETAMINOPHEN 650 MG/1
650 SUPPOSITORY RECTAL ONCE
Status: COMPLETED | OUTPATIENT
Start: 2022-01-31 | End: 2022-01-31

## 2022-01-31 RX ORDER — ONDANSETRON 2 MG/ML
4 INJECTION INTRAMUSCULAR; INTRAVENOUS
Status: DISCONTINUED | OUTPATIENT
Start: 2022-01-31 | End: 2022-02-01 | Stop reason: HOSPADM

## 2022-01-31 RX ORDER — OXYCODONE HYDROCHLORIDE 5 MG/1
5 TABLET ORAL
Status: DISCONTINUED | OUTPATIENT
Start: 2022-01-31 | End: 2022-02-01 | Stop reason: HOSPADM

## 2022-01-31 RX ORDER — MAGNESIUM SULFATE 100 %
4 CRYSTALS MISCELLANEOUS AS NEEDED
Status: DISCONTINUED | OUTPATIENT
Start: 2022-01-31 | End: 2022-01-31 | Stop reason: HOSPADM

## 2022-01-31 RX ORDER — LIDOCAINE HYDROCHLORIDE 10 MG/ML
0.1 INJECTION, SOLUTION EPIDURAL; INFILTRATION; INTRACAUDAL; PERINEURAL AS NEEDED
Status: DISCONTINUED | OUTPATIENT
Start: 2022-01-31 | End: 2022-01-31 | Stop reason: HOSPADM

## 2022-01-31 RX ORDER — PROPOFOL 10 MG/ML
INJECTION, EMULSION INTRAVENOUS AS NEEDED
Status: DISCONTINUED | OUTPATIENT
Start: 2022-01-31 | End: 2022-01-31 | Stop reason: HOSPADM

## 2022-01-31 RX ORDER — NALOXONE HYDROCHLORIDE 0.4 MG/ML
0.4 INJECTION, SOLUTION INTRAMUSCULAR; INTRAVENOUS; SUBCUTANEOUS AS NEEDED
Status: DISCONTINUED | OUTPATIENT
Start: 2022-01-31 | End: 2022-02-01 | Stop reason: HOSPADM

## 2022-01-31 RX ORDER — FENTANYL CITRATE 50 UG/ML
INJECTION, SOLUTION INTRAMUSCULAR; INTRAVENOUS AS NEEDED
Status: DISCONTINUED | OUTPATIENT
Start: 2022-01-31 | End: 2022-01-31 | Stop reason: HOSPADM

## 2022-01-31 RX ORDER — NEOSTIGMINE METHYLSULFATE 1 MG/ML
INJECTION, SOLUTION INTRAVENOUS AS NEEDED
Status: DISCONTINUED | OUTPATIENT
Start: 2022-01-31 | End: 2022-01-31 | Stop reason: HOSPADM

## 2022-01-31 RX ORDER — FAMOTIDINE 20 MG/1
20 TABLET, FILM COATED ORAL ONCE
Status: DISCONTINUED | OUTPATIENT
Start: 2022-01-31 | End: 2022-01-31 | Stop reason: HOSPADM

## 2022-01-31 RX ORDER — GLYCOPYRROLATE 0.2 MG/ML
INJECTION INTRAMUSCULAR; INTRAVENOUS AS NEEDED
Status: DISCONTINUED | OUTPATIENT
Start: 2022-01-31 | End: 2022-01-31 | Stop reason: HOSPADM

## 2022-01-31 RX ORDER — HYOSCYAMINE SULFATE 0.12 MG/1
0.12 TABLET SUBLINGUAL
Status: DISCONTINUED | OUTPATIENT
Start: 2022-01-31 | End: 2022-02-01 | Stop reason: HOSPADM

## 2022-01-31 RX ORDER — DIPHENHYDRAMINE HYDROCHLORIDE 50 MG/ML
25 INJECTION, SOLUTION INTRAMUSCULAR; INTRAVENOUS
Status: DISCONTINUED | OUTPATIENT
Start: 2022-01-31 | End: 2022-02-01 | Stop reason: HOSPADM

## 2022-01-31 RX ORDER — HYDROCODONE BITARTRATE AND ACETAMINOPHEN 5; 325 MG/1; MG/1
1 TABLET ORAL AS NEEDED
Status: DISCONTINUED | OUTPATIENT
Start: 2022-01-31 | End: 2022-01-31 | Stop reason: HOSPADM

## 2022-01-31 RX ORDER — CHLORHEXIDINE GLUCONATE 4 G/100ML
SOLUTION TOPICAL AS NEEDED
Status: DISCONTINUED | OUTPATIENT
Start: 2022-01-31 | End: 2022-01-31 | Stop reason: HOSPADM

## 2022-01-31 RX ORDER — HYDROMORPHONE HYDROCHLORIDE 2 MG/ML
0.5 INJECTION, SOLUTION INTRAMUSCULAR; INTRAVENOUS; SUBCUTANEOUS
Status: DISCONTINUED | OUTPATIENT
Start: 2022-01-31 | End: 2022-01-31 | Stop reason: HOSPADM

## 2022-01-31 RX ORDER — INSULIN LISPRO 100 [IU]/ML
INJECTION, SOLUTION INTRAVENOUS; SUBCUTANEOUS AS NEEDED
Status: DISCONTINUED | OUTPATIENT
Start: 2022-01-31 | End: 2022-01-31 | Stop reason: HOSPADM

## 2022-01-31 RX ORDER — SUCCINYLCHOLINE CHLORIDE 20 MG/ML
INJECTION INTRAMUSCULAR; INTRAVENOUS AS NEEDED
Status: DISCONTINUED | OUTPATIENT
Start: 2022-01-31 | End: 2022-01-31 | Stop reason: HOSPADM

## 2022-01-31 RX ORDER — ENOXAPARIN SODIUM 100 MG/ML
40 INJECTION SUBCUTANEOUS ONCE
Status: COMPLETED | OUTPATIENT
Start: 2022-01-31 | End: 2022-01-31

## 2022-01-31 RX ORDER — DIPHENHYDRAMINE HYDROCHLORIDE 50 MG/ML
12.5 INJECTION, SOLUTION INTRAMUSCULAR; INTRAVENOUS
Status: DISCONTINUED | OUTPATIENT
Start: 2022-01-31 | End: 2022-01-31 | Stop reason: HOSPADM

## 2022-01-31 RX ORDER — SODIUM CHLORIDE, SODIUM LACTATE, POTASSIUM CHLORIDE, CALCIUM CHLORIDE 600; 310; 30; 20 MG/100ML; MG/100ML; MG/100ML; MG/100ML
25 INJECTION, SOLUTION INTRAVENOUS CONTINUOUS
Status: DISCONTINUED | OUTPATIENT
Start: 2022-01-31 | End: 2022-01-31 | Stop reason: HOSPADM

## 2022-01-31 RX ORDER — SODIUM CHLORIDE 0.9 % (FLUSH) 0.9 %
5-40 SYRINGE (ML) INJECTION AS NEEDED
Status: DISCONTINUED | OUTPATIENT
Start: 2022-01-31 | End: 2022-01-31 | Stop reason: HOSPADM

## 2022-01-31 RX ORDER — SODIUM CHLORIDE, SODIUM LACTATE, POTASSIUM CHLORIDE, CALCIUM CHLORIDE 600; 310; 30; 20 MG/100ML; MG/100ML; MG/100ML; MG/100ML
150 INJECTION, SOLUTION INTRAVENOUS CONTINUOUS
Status: DISCONTINUED | OUTPATIENT
Start: 2022-01-31 | End: 2022-02-01 | Stop reason: HOSPADM

## 2022-01-31 RX ORDER — INSULIN LISPRO 100 [IU]/ML
INJECTION, SOLUTION INTRAVENOUS; SUBCUTANEOUS ONCE
Status: DISCONTINUED | OUTPATIENT
Start: 2022-01-31 | End: 2022-01-31 | Stop reason: HOSPADM

## 2022-01-31 RX ORDER — SODIUM CHLORIDE 0.9 % (FLUSH) 0.9 %
5-40 SYRINGE (ML) INJECTION EVERY 8 HOURS
Status: DISCONTINUED | OUTPATIENT
Start: 2022-01-31 | End: 2022-01-31 | Stop reason: HOSPADM

## 2022-01-31 RX ORDER — ROCURONIUM BROMIDE 10 MG/ML
INJECTION, SOLUTION INTRAVENOUS AS NEEDED
Status: DISCONTINUED | OUTPATIENT
Start: 2022-01-31 | End: 2022-01-31 | Stop reason: HOSPADM

## 2022-01-31 RX ORDER — WATER 1 ML/ML
IRRIGANT IRRIGATION AS NEEDED
Status: DISCONTINUED | OUTPATIENT
Start: 2022-01-31 | End: 2022-01-31 | Stop reason: HOSPADM

## 2022-01-31 RX ORDER — FLUMAZENIL 0.1 MG/ML
0.2 INJECTION INTRAVENOUS
Status: DISCONTINUED | OUTPATIENT
Start: 2022-01-31 | End: 2022-01-31 | Stop reason: HOSPADM

## 2022-01-31 RX ADMIN — GLYCOPYRROLATE 0.4 MG: 0.2 INJECTION INTRAMUSCULAR; INTRAVENOUS at 09:40

## 2022-01-31 RX ADMIN — OXYCODONE HYDROCHLORIDE 5 MG: 5 TABLET ORAL at 21:02

## 2022-01-31 RX ADMIN — PROPOFOL 100 MG: 10 INJECTION, EMULSION INTRAVENOUS at 07:44

## 2022-01-31 RX ADMIN — FAMOTIDINE 20 MG: 10 INJECTION INTRAVENOUS at 06:29

## 2022-01-31 RX ADMIN — FENTANYL CITRATE 50 MCG: 50 INJECTION, SOLUTION INTRAMUSCULAR; INTRAVENOUS at 08:01

## 2022-01-31 RX ADMIN — DEXAMETHASONE SODIUM PHOSPHATE 4 MG: 4 INJECTION, SOLUTION INTRAMUSCULAR; INTRAVENOUS at 09:20

## 2022-01-31 RX ADMIN — ONDANSETRON 4 MG: 2 INJECTION INTRAMUSCULAR; INTRAVENOUS at 10:35

## 2022-01-31 RX ADMIN — HYOSCYAMINE SULFATE 0.12 MG: 0.12 TABLET ORAL; SUBLINGUAL at 21:01

## 2022-01-31 RX ADMIN — SUCCINYLCHOLINE CHLORIDE 160 MG: 20 INJECTION, SOLUTION INTRAMUSCULAR; INTRAVENOUS at 07:40

## 2022-01-31 RX ADMIN — Medication 25 MG: at 07:55

## 2022-01-31 RX ADMIN — ROCURONIUM BROMIDE 5 MG: 50 INJECTION INTRAVENOUS at 07:40

## 2022-01-31 RX ADMIN — WATER 3 G: 1 INJECTION INTRAMUSCULAR; INTRAVENOUS; SUBCUTANEOUS at 07:55

## 2022-01-31 RX ADMIN — FENTANYL CITRATE 50 MCG: 50 INJECTION, SOLUTION INTRAMUSCULAR; INTRAVENOUS at 09:33

## 2022-01-31 RX ADMIN — KETOROLAC TROMETHAMINE 15 MG: 15 INJECTION, SOLUTION INTRAMUSCULAR; INTRAVENOUS at 21:03

## 2022-01-31 RX ADMIN — ROCURONIUM BROMIDE 30 MG: 50 INJECTION INTRAVENOUS at 07:50

## 2022-01-31 RX ADMIN — HYDROMORPHONE HYDROCHLORIDE 0.2 MG: 2 INJECTION, SOLUTION INTRAMUSCULAR; INTRAVENOUS; SUBCUTANEOUS at 10:35

## 2022-01-31 RX ADMIN — FENTANYL CITRATE 25 MCG: 50 INJECTION, SOLUTION INTRAMUSCULAR; INTRAVENOUS at 08:38

## 2022-01-31 RX ADMIN — SODIUM CHLORIDE, SODIUM LACTATE, POTASSIUM CHLORIDE, AND CALCIUM CHLORIDE: 600; 310; 30; 20 INJECTION, SOLUTION INTRAVENOUS at 09:03

## 2022-01-31 RX ADMIN — HYDROMORPHONE HYDROCHLORIDE 0.3 MG: 2 INJECTION, SOLUTION INTRAMUSCULAR; INTRAVENOUS; SUBCUTANEOUS at 10:45

## 2022-01-31 RX ADMIN — OXYCODONE HYDROCHLORIDE 5 MG: 5 TABLET ORAL at 15:34

## 2022-01-31 RX ADMIN — ONDANSETRON 4 MG: 2 INJECTION INTRAMUSCULAR; INTRAVENOUS at 09:39

## 2022-01-31 RX ADMIN — ENOXAPARIN SODIUM 40 MG: 100 INJECTION SUBCUTANEOUS at 06:29

## 2022-01-31 RX ADMIN — HYDROMORPHONE HYDROCHLORIDE 0.5 MG: 2 INJECTION, SOLUTION INTRAMUSCULAR; INTRAVENOUS; SUBCUTANEOUS at 11:50

## 2022-01-31 RX ADMIN — SODIUM CHLORIDE, SODIUM LACTATE, POTASSIUM CHLORIDE, AND CALCIUM CHLORIDE 150 ML/HR: 600; 310; 30; 20 INJECTION, SOLUTION INTRAVENOUS at 13:48

## 2022-01-31 RX ADMIN — KETOROLAC TROMETHAMINE 15 MG: 15 INJECTION, SOLUTION INTRAMUSCULAR; INTRAVENOUS at 13:22

## 2022-01-31 RX ADMIN — SODIUM CHLORIDE, SODIUM LACTATE, POTASSIUM CHLORIDE, AND CALCIUM CHLORIDE 150 ML/HR: 600; 310; 30; 20 INJECTION, SOLUTION INTRAVENOUS at 17:20

## 2022-01-31 RX ADMIN — ACETAMINOPHEN 650 MG: 325 TABLET ORAL at 13:22

## 2022-01-31 RX ADMIN — SODIUM CHLORIDE, SODIUM LACTATE, POTASSIUM CHLORIDE, AND CALCIUM CHLORIDE 25 ML/HR: 600; 310; 30; 20 INJECTION, SOLUTION INTRAVENOUS at 06:30

## 2022-01-31 RX ADMIN — Medication 25 MG: at 09:33

## 2022-01-31 RX ADMIN — PROPOFOL 200 MG: 10 INJECTION, EMULSION INTRAVENOUS at 07:40

## 2022-01-31 RX ADMIN — FENTANYL CITRATE 25 MCG: 50 INJECTION, SOLUTION INTRAMUSCULAR; INTRAVENOUS at 08:34

## 2022-01-31 RX ADMIN — ROCURONIUM BROMIDE 20 MG: 50 INJECTION INTRAVENOUS at 08:33

## 2022-01-31 RX ADMIN — HYOSCYAMINE SULFATE 0.12 MG: 0.12 TABLET ORAL; SUBLINGUAL at 13:22

## 2022-01-31 RX ADMIN — MIDAZOLAM HYDROCHLORIDE 1 MG: 2 INJECTION, SOLUTION INTRAMUSCULAR; INTRAVENOUS at 07:33

## 2022-01-31 RX ADMIN — SUCCINYLCHOLINE CHLORIDE 40 MG: 20 INJECTION, SOLUTION INTRAMUSCULAR; INTRAVENOUS at 07:44

## 2022-01-31 RX ADMIN — MIDAZOLAM HYDROCHLORIDE 1 MG: 2 INJECTION, SOLUTION INTRAMUSCULAR; INTRAVENOUS at 07:38

## 2022-01-31 RX ADMIN — FENTANYL CITRATE 50 MCG: 50 INJECTION, SOLUTION INTRAMUSCULAR; INTRAVENOUS at 07:40

## 2022-01-31 RX ADMIN — Medication 3 MG: at 09:40

## 2022-01-31 NOTE — ANESTHESIA PREPROCEDURE EVALUATION
Relevant Problems   ENDOCRINE   (+) Morbid obesity (HCC)       Anesthetic History   No history of anesthetic complications            Review of Systems / Medical History  Patient summary reviewed and pertinent labs reviewed    Pulmonary  Within defined limits                 Neuro/Psych   Within defined limits           Cardiovascular  Within defined limits                     GI/Hepatic/Renal  Within defined limits              Endo/Other    Diabetes: type 2    Morbid obesity     Other Findings              Physical Exam    Airway  Mallampati: II  TM Distance: 4 - 6 cm  Neck ROM: normal range of motion, short neck   Mouth opening: Normal     Cardiovascular  Regular rate and rhythm,  S1 and S2 normal,  no murmur, click, rub, or gallop             Dental  No notable dental hx       Pulmonary  Breath sounds clear to auscultation               Abdominal  GI exam deferred       Other Findings            Anesthetic Plan    ASA: 3  Anesthesia type: general          Induction: Intravenous  Anesthetic plan and risks discussed with: Patient

## 2022-01-31 NOTE — ANESTHESIA POSTPROCEDURE EVALUATION
Procedure(s):  LAPAROSCOPIC JOSE-EN-Y GASTRIC BYPASS with Liver Wedge Biopsy. general    Anesthesia Post Evaluation      Multimodal analgesia: multimodal analgesia used between 6 hours prior to anesthesia start to PACU discharge  Patient location during evaluation: bedside  Patient participation: complete - patient participated  Level of consciousness: awake  Pain management: adequate  Airway patency: patent  Anesthetic complications: no  Cardiovascular status: stable  Respiratory status: acceptable  Hydration status: acceptable  Post anesthesia nausea and vomiting:  controlled      INITIAL Post-op Vital signs:   Vitals Value Taken Time   /84 01/31/22 1113   Temp 36.1 °C (97 °F) 01/31/22 0956   Pulse 76 01/31/22 1114   Resp 15 01/31/22 1114   SpO2 96 % 01/31/22 1114   Vitals shown include unvalidated device data.

## 2022-01-31 NOTE — ROUTINE PROCESS
Received pt from PACU. Pt awake and alert. Pt given ice chips for comfort. No acute distress noted. Scds applied bilat. 1443-Pt assisted to the chair. Pt stable.

## 2022-01-31 NOTE — PROGRESS NOTES
Patient resting at this time, no distress noted. Ambulating in hallway. NPO except Ice chips. Complaint of cramping and soreness, given prn pain medication per MAR. Voiding in bathroom. Using Incentive spirometer independently. Incisions C/D/I. SDCs in place. Report to be given to oncoming RN via SBAR and bedside report. Will continue to monitor patient.

## 2022-01-31 NOTE — PROGRESS NOTES
Problem: Pain  Goal: *Control of Pain  Outcome: Progressing Towards Goal     Problem: Patient Education: Go to Patient Education Activity  Goal: Patient/Family Education  Outcome: Progressing Towards Goal     Problem: Falls - Risk of  Goal: *Absence of Falls  Description: Document Delta Morrison Fall Risk and appropriate interventions in the flowsheet.   Outcome: Progressing Towards Goal  Note: Fall Risk Interventions:            Medication Interventions: Teach patient to arise slowly,Patient to call before getting OOB                   Problem: Patient Education: Go to Patient Education Activity  Goal: Patient/Family Education  Outcome: Progressing Towards Goal     Problem: Patient Education: Go to Patient Education Activity  Goal: Patient/Family Education  Outcome: Progressing Towards Goal     Problem: Laparoscopic Gastric Bypass:Day of Surgery  Goal: Off Pathway (Use only if patient is Off Pathway)  Outcome: Progressing Towards Goal  Goal: Activity/Safety  Outcome: Progressing Towards Goal  Goal: Consults, if ordered  Outcome: Progressing Towards Goal  Goal: Diagnostic Test/Procedures  Outcome: Progressing Towards Goal  Goal: Nutrition/Diet  Outcome: Progressing Towards Goal  Goal: Medications  Outcome: Progressing Towards Goal  Goal: Respiratory  Outcome: Progressing Towards Goal  Goal: Treatments/Interventions/Procedures  Outcome: Progressing Towards Goal  Goal: Psychosocial  Outcome: Progressing Towards Goal  Goal: *No signs and symptoms of infection or wound complications  Outcome: Progressing Towards Goal  Goal: *Optimal pain control at patient's stated goal  Outcome: Progressing Towards Goal  Goal: *Adequate urinary output (equal to or greater than 30 milliliters/hour)  Outcome: Progressing Towards Goal  Goal: *Hemodynamically stable  Outcome: Progressing Towards Goal  Goal: *Tolerating diet  Outcome: Progressing Towards Goal  Goal: *Demonstrates progressive activity  Outcome: Progressing Towards Goal  Goal: *Absence of signs and symptoms of DVT  Outcome: Progressing Towards Goal  Goal: *Labs within defined limits  Outcome: Progressing Towards Goal  Goal: *Oxygen saturation within defined limits  Outcome: Progressing Towards Goal     Problem: Laparoscopic Gastric Bypass:Post-Op Day 1  Goal: Off Pathway (Use only if patient is Off Pathway)  Outcome: Progressing Towards Goal  Goal: Activity/Safety  Outcome: Progressing Towards Goal  Goal: Diagnostic Test/Procedures  Outcome: Progressing Towards Goal  Goal: Nutrition/Diet  Outcome: Progressing Towards Goal  Goal: Discharge Planning  Outcome: Progressing Towards Goal  Goal: Medications  Outcome: Progressing Towards Goal  Goal: Respiratory  Outcome: Progressing Towards Goal  Goal: Treatments/Interventions/Procedures  Outcome: Progressing Towards Goal  Goal: Psychosocial  Outcome: Progressing Towards Goal  Goal: *No signs and symptoms of infection or wound complications  Outcome: Progressing Towards Goal  Goal: *Optimal pain control at patient's stated goal  Outcome: Progressing Towards Goal  Goal: *Adequate urinary output (equal to or greater than 30 milliliters/hour)  Outcome: Progressing Towards Goal  Goal: *Hemodynamically stable  Outcome: Progressing Towards Goal  Goal: *Tolerating diet  Outcome: Progressing Towards Goal  Goal: *Demonstrates progressive activity  Outcome: Progressing Towards Goal  Goal: *Absence of signs and symptoms of DVT  Outcome: Progressing Towards Goal  Goal: *Labs within defined limits  Outcome: Progressing Towards Goal  Goal: *Oxygen saturation within defined limits  Outcome: Progressing Towards Goal  Goal: *Upper GI series/No leak identified  Outcome: Progressing Towards Goal     Problem: Laparoscopic Gastric Bypass:Post-Op Day 2  Goal: Off Pathway (Use only if patient is Off Pathway)  Outcome: Progressing Towards Goal  Goal: Activity/Safety  Outcome: Progressing Towards Goal  Goal: Diagnostic Test/Procedures  Outcome: Progressing Towards Goal  Goal: Nutrition/Diet  Outcome: Progressing Towards Goal  Goal: Discharge Planning  Outcome: Progressing Towards Goal  Goal: Medications  Outcome: Progressing Towards Goal  Goal: Respiratory  Outcome: Progressing Towards Goal  Goal: Treatments/Interventions/Procedures  Outcome: Progressing Towards Goal  Goal: Psychosocial  Outcome: Progressing Towards Goal     Problem: Laparoscopic Gastric Bypass:Discharge Outcomes  Goal: *Active bowel sounds  Outcome: Progressing Towards Goal  Goal: *Absence of signs and symptoms of DVT  Outcome: Progressing Towards Goal  Goal: *Hemodynamically stable  Outcome: Progressing Towards Goal  Goal: *Lungs clear or at baseline  Outcome: Progressing Towards Goal  Goal: *Demonstrates independent activity or return to baseline  Outcome: Progressing Towards Goal  Goal: *Optimal pain control at patient's stated goal  Outcome: Progressing Towards Goal  Goal: *Verbalizes understanding and describes prescribed diet  Outcome: Progressing Towards Goal  Goal: *Tolerating diet  Outcome: Progressing Towards Goal  Goal: *Verbalizes name, dosage, time, side effects, and number of days to continue medications  Outcome: Progressing Towards Goal  Goal: *No signs and symptoms of infection or wound complications  Outcome: Progressing Towards Goal  Goal: *Anxiety reduced or absent  Outcome: Progressing Towards Goal  Goal: *Understands and describes signs and symptoms to report to providers (eg: s/s of leak, DVT; inability to tolerate diet)  Outcome: Progressing Towards Goal  Goal: *Describes follow-up/return visits to physicians  Outcome: Progressing Towards Goal  Goal: *Describes available resources and support systems  Outcome: Progressing Towards Goal

## 2022-01-31 NOTE — OP NOTES
[address] [place] [address]   OPERATIVE REPORT    Name:  Niel Torres  MR#:   [id number]  :  1985  ACCOUNT #:  [de-identified]  DATE OF SERVICE:  2022      PREOPERATIVE DIAGNOSES:  1. 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 2.  Suspected hepatic steatosis. POSTOPERATIVE DIAGNOSES:  1. Super obesity with body mass index of 51 with obesity-related comorbidities of gastroesophageal reflux disease, stress urinary incontinence, polycystic ovarian syndrome, clinical obstructive sleep apnea, plantar fascitis, weight-related arthropathy. 2.  Hepatomegaly/hepatic steatosis. PROCEDURES PERFORMED:  1. Laparoscopic Mckenna-en-Y gastric bypass utilizing a 15 mL separate tubularized gastric pouch based on the lesser curvature of the stomach, a 858-FJ retrocolic/retrogastric Mckenna limb, and a 40-cm biliopancreatic limb. 2.  Laparoscopic left hepatic lobe wedge biopsy. SURGEON:  Arnold Ledezma DO    FIRST ASSISTANT:  Kendra Tapia SA    ANESTHESIA:  General endotracheal supplemented at the conclusion of the operative procedure with local infiltration of the operative sites utilizing 266 mg of Exparel diluted in 50 mL of normal saline solution. COMPLICATIONS:  None. SPECIMENS REMOVED:  Left hepatic lobe wedge biopsy. IMPLANTS: None    ESTIMATED BLOOD LOSS:  Less than 25 mL. OPERATIVE FINDINGS:  Hepatomegaly with morphologic appearance of hepatic steatosis.     INDICATIONS FOR SURGICAL PROCEDURE:  This is a 44-year-old white female who has failed medical management of clinically severe obesity and after investigating the surgical options for definitive weight loss, has elected to proceed with laparoscopic, potentially open Mckenna-en-Y gastric bypass to achieve definitive durable weight loss on a personal level with expected resolution of obesity-related comorbidities. The risks and benefits of operative versus nonoperative potential alternatives and potential complications up to and including death were extensively discussed with the patient prior to the surgical procedure who voiced her understanding and wished to proceed. DESCRIPTION OF PROCEDURE:  The patient received Ancef for antibiotic prophylaxis and Lovenox for DVT prophylaxis in the preoperative staging area. After voiding and signing her operative permit, which was properly witnessed, she was then transported to the operating room, where she was placed in the supine position on the operating table. SCDs were placed and inflated prior to induction of general endotracheal anesthesia. The abdomen was then prepped and draped in the usual sterile fashion. A 34-Chinese orogastric tube was placed atraumatically to gravity drainage for utilization as a sizing template for construction of the gastric pouch as well as gastric decompression. After a timeout procedure performed and confirmed by all personnel involved in the operating case, the initial incision was made just superior to the umbilicus 29-IQ in length through which a 12-mm Optiview trocar and cannula were placed in the peritoneal cavity under direct vision utilizing a 10-mm 0-degree laparoscope. The laparoscope and trocar were removed. The abdomen was insufflated with carbon dioxide gas never exceeding a pressure of 15 mmHg. A 30-degree 10-mm laparoscope was then placed and utilized for the remainder of the procedure. Remaining ports were placed under direct vision utilizing a transverse 12-mm cutaneous incision.   The second, a 5-mm cutaneus incision in the left anterior axillary line 2 fingerbreadths below the left costal margin, the third a 12-mm incision midway between the left upper quadrant and the supraumbilical incision, and the fourth a 12-mm incision in the right paramedian location 8 cm from the midline midway between the costal margin and level of the umbilicus. After placing the appropriately sized OptiView trocars and cannulas under direct vision, the upper abdomen was visualized. The laparoscopic exam was notable for hepatomegaly with morphologic appearance of hepatic steatosis and is otherwise unremarkable. The omentum and transverse colon reflected superiorly. The ligament of Treitz was identified; 40 cm distal to the ligament of Treitz, the jejunum was transected with a triple load stapling device 60 mm in length with 2.5-mm staples. The mesentery was then divided down to its base utilizing the Harmonic scalpel. The Mckenna limb was then measured to be 155 cm in length and at this point on its antimesenteric border, an enterotomy was created with Harmonic scalpel. A similar antimesenteric enterotomy was created 2 cm proximal to the staple line of the biliopancreatic limb and a stapled side-to-side functional end-to-side jejunojejunostomy was constructed utilizing a triple-load stapling device, 60 mm in length, loaded with 2.5-mm staples, placing one arm of the stapling device into each of the respective limbs prior to firing it on the antimesenteric borders. The enteric defect was then closed with a running full-thickness 3-0 Vicryl suture and the mesenteric defect was closed with a running 2-0 silk suture. The ligament of Treitz was re-identified. Just anterior to the ligament of Treitz, a fenestration was created through the mesocolon into the lesser sac utilizing the Harmonic scalpel and the Mckenna limb was placed through this mesocolonic fenestration into the lesser sac. The omentum and transverse colon were reflected back to their normal anatomic positions. The larry was identified along the lesser curvature of the stomach.   Just inferior to the larry along the greater curvature of the stomach, the omentum was  from the gastric wall utilizing the Harmonic scalpel until the Mckenna limb was visualized within the lesser sac. A transverse 5-mm cutaneous incision was made one-third the distance from the xiphisternal junction to the umbilicus in the midline, through which a 5-mm trocar without a cannula was placed in the peritoneal cavity under direct vision. This was then removed and replaced with a 5-mm atraumatic grasping forceps, which was utilized to elevate the left lobe of the  liver and provide hiatal exposure. The peritoneum overlying the angle of His was then opened with the Harmonic scalpel; 5 cm distal to the GE junction along the lesser curvature of the stomach, the neurovascular elements of the lesser omentum were  from the gastric wall utilizing a Harmonic scalpel. Completion of this lesser curve fenestration into the lesser sac was accomplished utilizing an esophageal retractor introduced through the omental fenestration along the greater curvature of the stomach and posterior to the stomach. The 34-Bahraini orogastric tube was then retracted into the esophagus. A triple-load stapling device, 60 mm in length, loaded with 3.5-mm staples was introduced into the lesser curve fenestration. It was oriented perpendicular to the lesser curve 5 cm distal to the GE junction prior to firing two-thirds the length of the staple load. The 34-Bahraini orogastric tube was then advanced to be utilized as a sizing template for construction of the tubularized gastric pouch. The vertical aspect of the gastric pouch was initiated with a triple-load stapling device, 60 mm in length, loaded with 3.5-mm staples utilizing two-thirds the length of a staple load. The remainder of the pouch was constructed with sequential firings of a triple-load stapling device, 60 mm in length, loaded with 3.5-mm staples ending the transection at the angle of His.   The initial full length firing of the 3.5-mm device utilized an Ethicon staple line reinforcement absorbable implant and this created a 15 mL separate tubularized gastric pouch based on the lesser curvature of the stomach. The Mckenna limb was elevated posterior to the stomach in retrogastric position, where a tension-free hand-sewn two-layered gastrojejunostomy was constructed. The geometric orientation of the gastrojejunostomy was at the distal aspect of the tubularized gastric pouch and the antimesenteric border of the Mckenna limb 2 cm distal to the staple line. The posterior row of running seromuscular 3-0 Vicryl suture was placed. A transverse 12-mm gastrotomy was made at the distal aspect of the tubularized gastric pouch with an adjacent antimesenteric 12-mm Mckenna limb enterotomy. The running inner layer of full-thickness 3-0 Vicryl suture was initiated in the left lateral posterior aspect of the anastomosis, running across the posterior aspect of the anastomosis, running around the right lateral aspect of the anastomosis to the anterior midline. A second full-thickness 3-0 Vicryl suture was initiated adjacent to the origin of the first and running around the left lateral aspect of the anastomosis to the anterior midline. The 34-Uzbek orogastric tube was advanced across the gastrojejunal anastomosis into the Mckenna limb prior to tying with a running inner layer of full-thickness 3-0 Vicryl suture together anteriorly. The gastrojejunal anastomosis was then completed anteriorly utilizing a running 3-0 Vicryl seromuscular suture. The 34-Uzbek orogastric tube was removed and after assuring there was adequate redundancy of the Mckenna limb above the level of the mesocolon, the omentum and transverse colon were reflected superiorly. The space through which a Henderson's hernia might occur was closed with a running 2-0 silk suture and the mesocolic defect was closed with a series of simple interrupted 2-0 silk sutures. The omentum and transverse colon were returned to their normal anatomic position.   The Mckenna limb was noted to be viable with adequate redundancy above the level of the mesocolon. There was no tension noted at the gastrojejunal anastomosis. The self-retaining retractor and atraumatic grasping forceps were removed. The free edge of the left lobe of the liver was retracted in such a fashion so that a 1.5-cm wedge-shaped biopsy could be obtained sharply, which was submitted for definitive histologic characterization. Hemostasis was then established with electrocautery. The laparoscope was shifted to the left midabdominal port to  allow visualization of the supraumbilical fascial trocar defect which was closed with a suture passing device and #2 Vicryl suture. All operative sites were inspected and noted to be hemostatic. The abdomen was then desufflated off carbon dioxide gas. Trocars were removed under direct vision. The fascial suture was tied. The wounds were irrigated with normal saline solution. Closure of the skin was accomplished with a series of simple interrupted buried deep dermal 4-0 Monocryl sutures. The incisions were infiltrated with 266 mg of Exparel diluted in 50 mL of normal saline solution. After closure of the dermis with the buried deep dermal 4-0 Monocryl sutures, Steri-Strips were applied as were sterile dressings. The sponge and needle counts were correct both during and at the completion of the case. The patient tolerated the procedure without operative complications, subsequently was extubated and transported to the recovery room in awake, alert, and stable condition. The patient received 1100 mL of crystalloid during the procedure. Operative start time was 0801, completion time was 0941.       DO CHRIS [email or web address]  D:  01/31/2022 10:17  T:  01/31/2022 12:41  JOB #:  [id number]

## 2022-01-31 NOTE — PROGRESS NOTES
conducted a pre-surgery visit with Rico Cruz, who is a 29 y.o.,female. The  provided the following Interventions:  Initiated a relationship of care and support. Offered prayer and assurance of continued prayers on patient's behalf. There is no advance directive present. Plan:  Chaplains will continue to follow and will provide pastoral care on an as needed/requested basis.  recommends bedside caregivers page  on duty if patient shows signs of acute spiritual or emotional distress.     Reiseñor 3   Board Certified [address] [place] [address]   [phone]

## 2022-01-31 NOTE — H&P
Office Visit    11/26/2021  [facility] [facility]   [place], [place]    General Surgery  BMI 50.0-59.9, adult [facility])    Dx  Follow-up ; Referred by Unknown    Reason for Visit       Progress Notes  Pierre Malone DO (Physician) [facility] General Surgery     Preop History and Physical Exam:     Lisseth Macias is a 29 y.o. female who presents in follow-up after initial evaluation by [facility] nurse practitioner on June 21, 2021 for discussion of the surgical options over the definitive management of her super obesity. Patient at that time weighed 336 pounds and a 5 foot 8 inch frame with a body mass index of 51 with obesity related comorbidities of gastroesophageal reflux disease, stress urinary incontinence, polycystic ovarian syndrome, clinical obstructive sleep apnea and weight related arthropathyknees. The patient after discussing surgical options elected pursue laparoscopic potentially open Mckenna-en-Y gastric bypass to achieve definitive durable weight loss on a personal level. The patient presents in follow-up today after completing all multidisciplinary bariatric surgical multidisciplinary requirements for review of the diagnostic evaluation noting no new medical or surgical history. Allergies: Augmentin  Current medications: See medication list  Past medical history:  1. Super obesity with a body mass index of 50 with obesity related comorbidities consisting of gastroesophageal reflux disease, stress urinary incontinence, polycystic ovarian syndrome, clinical obstructive sleep apnea, plantar fasciitis, weight related arthropathyknees  Past surgical history:  1. Tonsillectomy/childhood  2. Uterine polypectomy 2020  Social history:  Tobacco: None  Alcohol: 1 ounce on a weekly basis  Family history:   Mother 63clinically severe obesity, obstructive sleep apnea, hypothyroidism  Father 61clinically severe obesity, hypertension, hypothyroidism,  Brother 39clinically severe obesity, obstructive sleep apnea             Past Medical History:   Diagnosis Date    Diabetes ([address]       Pre DM    Obesity (BMI 35.0-39.9 without comorbidity)      PCOS (polycystic ovarian syndrome)      Plantar fasciitis                 Past Surgical History:   Procedure Laterality Date    HX GYN   2020     polypectomy uterus     HX TONSILLECTOMY                    Current Outpatient Medications   Medication Sig Dispense Refill    aspirin delayed-release 81 mg tablet Take  by mouth daily. (Patient not taking: Reported on 11/26/2021)                  Allergies   Allergen Reactions    Augmentin [Amoxicillin-Pot Clavulanate] Nausea and Vomiting         Social History            Tobacco Use    Smoking status: Never Smoker    Smokeless tobacco: Never Used   Vaping Use    Vaping Use: Never used   Substance Use Topics    Alcohol use: Not Currently       Alcohol/week: 1.0 standard drink       Types: 1 Glasses of wine per week       Comment: socially    Drug use:  No               Family History   Problem Relation Age of Onset    Thyroid Disease Mother      Arthritis-osteo Mother      Obesity Mother      Sleep Apnea Mother      Heart Attack Mother 36    Hypertension Father      Thyroid Disease Father      Sleep Apnea Brother      Obesity Brother           Review of Systems:  Positive in BOLD     CONST: Fever, weight loss, fatigue or chills  GI: Nausea, vomiting, abdominal pain, change in bowel habits, hematochezia, melena, and GERD   INTEG: Dermatitis, abnormal moles  HEENT: Recent changes in vision, vertigo, epistaxis, dysphagia and hoarseness  CV: Chest pain, palpitations, HTN, edema and varicosities  RESP: Cough, shortness of breath, wheezing, hemoptysis, snoring and reactive airway disease  : Hematuria, dysuria, frequency, urgency, nocturia and stress urinary incontinence   MS: Weakness, joint pain and arthritis  ENDO: Diabetes, thyroid disease, polyuria, polydipsia, polyphagia, poor wound healing, heat intolerance, cold intolerance  LYMPH/HEME: Anemia, bruising and history of blood transfusions  NEURO: Dizziness, headache, fainting, seizures and stroke  PSYCH: Anxiety and depression     Physical Exam     Visit Vitals  /84   Pulse 80   Temp 97.8 °F (36.6 °C)   Resp 18   Ht 5' 8\" (1.727 m)   Wt 150.6 kg (332 lb)   SpO2 97%   BMI 50.48 kg/m²            General: Super obese 41-year-old female in no acute distress  Head: Normocephalic, atraumatic  Mouth: Clear, no overt lesions, oral mucosa pink and moist  Neck: Supple, no masses, no adenopathy or carotid bruits, trachea midline  Resp: Clear to auscultation bilaterally, now wheeze, rhonchi, or rales, excursions normal and symmetrical  Cardio: Regular rate and rhythm, no murmurs, clicks, gallops, or rubs. No edema or varicosities.   Abdomen: Obese, soft, nontender, nondistended, normoactive bowel sounds, no hernias, no hepatosplenomegaly,  Extremities: Warm, well perfused, no tenderness or swelling, normal gait/station  Neuro: Sensation and strength grossly intact and symmetrical  Psych: Alert and oriented to person, place, and time.        September 14, 2021, October 6, 2021 CBC, BMP, albumin, cholesterol panel, TSH, urinalysis, vitamin B1, B12, folate, iron, vitamin D, H. pylori, hemoglobin A1c: Glucose 107, vitamin D 23.7 with remainder laboratory profile within normal limits  November 2, 2021 Pap smear: No evidence of malignancy  October 22, 2021 chest x-ray: No active cardiopulmonary disease  June 14, 2021 upper GI: Normal  November 2, 2021 bariatric nutrition/planning: Concurring with procedures surgery  October 13, 2021 psychology/Velma: Concurrent with procedures surgery  September 21, 2021 EKG: Normal sinus rhythm with rate of 66 with sinus arrhythmia otherwise normal EKG     Impression:     Idalia Harris is a 29 y.o. female with a body mass index of 50 with obesity related comorbidities of gastroesophageal reflux disease, stress urinary incontinence, polycystic ovarian syndrome, clinical obstructive sleep apnea, weight related arthropathyknees who would benefit from bariatric surgery. We've discussed the restrictive and malabsorptive nature of the gastric bypass and compared and contrasted with the sleeve gastrectomy. The patient understands the likelihood of losing approximately 80% of their excess weight in 12 to 18 months. She also understands the risks including but not limited to bleeding, infection, need for reoperation, anastomotic ulcers, leaks and strictures, bowel obstruction secondary to adhesions and internal hernias, DVT, PE, heart attack, stroke, and death. Patient also understands risks of inadequate weight loss, excess weight loss, vitamin insufficiency, protein malnutrition, excess skin, and loss of hair. We have reviewed the components of a successful postoperative course including requirement for a high protein, low carbohydrate diet, 60 oz a day of zero calorie liquids, daily vitamin supplementation, daily exercise, regular follow-up, and participation in support groups. We have reviewed the preoperative liver shrinking clear liquid diet, as well as reviewed any medication changes required while on the clear liquid diet. In addition, the patient understands that all medications to be taken during the first 8 weeks postoperatively can be taken whole as long as the medication is approximately the size of a Cady 325 mg aspirin tablet in size. The patient further understands that it is his/her responsibility to review these and verify with their primary care doctor and pharmacist that all medications are of the appropriate size. We will schedule the patient for laparoscopic gastric bypass in the near future. The above history and physical examination was reviewed.   There is been no interval medical or surgical history the proposed laparoscopic potentially open Mckenna-en-Y gastric bypass to achieve definitive durable weight loss on a personal level with expected resolution of obesity related comorbidities was reviewed. The risk benefits of operating versus not potential alternatives potential complications up to and including death were again discussed. The patient noted her understanding of the discussion wished to proceed.     Bere Campbell, DO, FACS

## 2022-01-31 NOTE — BRIEF OP NOTE
Brief Postoperative Note    Patient: Rosa Abdi  YOB: 1985  MRN: [id number]    Date of Procedure: 1/31/2022     Pre-Op Diagnosis: Morbid obesity ([facility] [address] [E66.01]    Post-Op Diagnosis:  1. Super obesity with a body mass index of 51  2. Hepatic steatosis    Procedure(s):  1. LAPAROSCOPIC JOSE-EN-Y GASTRIC BYPASS  2.   Laparoscopic left pedicle wedge biopsy    Surgeon(s):  Kaitlyn Workman DO    Surgical Assistant: Surg Asst-1: Kendra Tapia    Anesthesia: General     Estimated Blood Loss (mL): Minimal    Complications: None    Specimens:   ID Type Source Tests Collected by Time Destination   1 : Liver Wedge Biopsy  Preservative Liver specimen  Kaitlyn Workman DO 1/31/2022 0029 Pathology        Implants: * No implants in log *    Drains: * No LDAs found *    Findings: Hepatomegaly/hepatic steatosis    Electronically Signed by West Gregory DO on 1/31/2022 at 10:00 AM

## 2022-02-01 VITALS
HEIGHT: 68 IN | OXYGEN SATURATION: 95 % | WEIGHT: 293 LBS | BODY MASS INDEX: 44.41 KG/M2 | RESPIRATION RATE: 16 BRPM | TEMPERATURE: 97 F | HEART RATE: 84 BPM | DIASTOLIC BLOOD PRESSURE: 72 MMHG | SYSTOLIC BLOOD PRESSURE: 121 MMHG

## 2022-02-01 LAB
ANION GAP SERPL CALC-SCNC: 5 MMOL/L (ref 3–18)
BUN SERPL-MCNC: 10 MG/DL (ref 7–18)
BUN/CREAT SERPL: 14 (ref 12–20)
CALCIUM SERPL-MCNC: 8.8 MG/DL (ref 8.5–10.1)
CHLORIDE SERPL-SCNC: 107 MMOL/L (ref 100–111)
CO2 SERPL-SCNC: 24 MMOL/L (ref 21–32)
CREAT SERPL-MCNC: 0.7 MG/DL (ref 0.6–1.3)
ERYTHROCYTE [DISTWIDTH] IN BLOOD BY AUTOMATED COUNT: 12.3 % (ref 11.6–14.5)
GLUCOSE SERPL-MCNC: 101 MG/DL (ref 74–99)
HCT VFR BLD AUTO: 36.8 % (ref 35–45)
HGB BLD-MCNC: 12 G/DL (ref 12–16)
MCH RBC QN AUTO: 28.8 PG (ref 24–34)
MCHC RBC AUTO-ENTMCNC: 32.6 G/DL (ref 31–37)
MCV RBC AUTO: 88.2 FL (ref 78–100)
NRBC # BLD: 0 K/UL (ref 0–0.01)
NRBC BLD-RTO: 0 PER 100 WBC
PLATELET # BLD AUTO: 224 K/UL (ref 135–420)
PMV BLD AUTO: 10 FL (ref 9.2–11.8)
POTASSIUM SERPL-SCNC: 4.5 MMOL/L (ref 3.5–5.5)
RBC # BLD AUTO: 4.17 M/UL (ref 4.2–5.3)
SODIUM SERPL-SCNC: 136 MMOL/L (ref 136–145)
WBC # BLD AUTO: 10.3 K/UL (ref 4.6–13.2)

## 2022-02-01 PROCEDURE — 36415 COLL VENOUS BLD VENIPUNCTURE: CPT

## 2022-02-01 PROCEDURE — C9113 INJ PANTOPRAZOLE SODIUM, VIA: HCPCS | Performed by: SURGERY

## 2022-02-01 PROCEDURE — 85027 COMPLETE CBC AUTOMATED: CPT

## 2022-02-01 PROCEDURE — 74011000250 HC RX REV CODE- 250: Performed by: SURGERY

## 2022-02-01 PROCEDURE — 74011250636 HC RX REV CODE- 250/636: Performed by: SURGERY

## 2022-02-01 PROCEDURE — 74011250637 HC RX REV CODE- 250/637: Performed by: SURGERY

## 2022-02-01 PROCEDURE — 80048 BASIC METABOLIC PNL TOTAL CA: CPT

## 2022-02-01 RX ORDER — ENOXAPARIN SODIUM 100 MG/ML
40 INJECTION SUBCUTANEOUS EVERY 24 HOURS
Qty: 7 EACH | Refills: 0 | Status: SHIPPED | OUTPATIENT
Start: 2022-02-01 | End: 2022-03-03

## 2022-02-01 RX ORDER — OXYCODONE HYDROCHLORIDE 5 MG/1
5 TABLET ORAL
Qty: 10 TABLET | Refills: 0 | Status: SHIPPED | OUTPATIENT
Start: 2022-02-01 | End: 2022-02-04

## 2022-02-01 RX ORDER — ONDANSETRON 4 MG/1
4 TABLET, ORALLY DISINTEGRATING ORAL
Qty: 12 TABLET | Refills: 0 | Status: SHIPPED | OUTPATIENT
Start: 2022-02-01 | End: 2022-03-03

## 2022-02-01 RX ADMIN — KETOROLAC TROMETHAMINE 15 MG: 15 INJECTION, SOLUTION INTRAMUSCULAR; INTRAVENOUS at 03:32

## 2022-02-01 RX ADMIN — ENOXAPARIN SODIUM 40 MG: 100 INJECTION SUBCUTANEOUS at 08:32

## 2022-02-01 RX ADMIN — SODIUM CHLORIDE 40 MG: 9 INJECTION, SOLUTION INTRAMUSCULAR; INTRAVENOUS; SUBCUTANEOUS at 08:32

## 2022-02-01 RX ADMIN — HYOSCYAMINE SULFATE 0.12 MG: 0.12 TABLET ORAL; SUBLINGUAL at 10:26

## 2022-02-01 RX ADMIN — ACETAMINOPHEN 650 MG: 325 TABLET ORAL at 10:26

## 2022-02-01 RX ADMIN — ACETAMINOPHEN 650 MG: 325 TABLET ORAL at 06:23

## 2022-02-01 RX ADMIN — OXYCODONE HYDROCHLORIDE 5 MG: 5 TABLET ORAL at 01:13

## 2022-02-01 RX ADMIN — OXYCODONE HYDROCHLORIDE 5 MG: 5 TABLET ORAL at 06:22

## 2022-02-01 RX ADMIN — HYOSCYAMINE SULFATE 0.12 MG: 0.12 TABLET ORAL; SUBLINGUAL at 03:18

## 2022-02-01 NOTE — PROGRESS NOTES
Surgery Progress Note    2/1/2022    Admit Date: 1/31/2022    Subjective:     Patient has complaints of pain and is controlled with current plan. She also reports some superficial numbness of left outside lower thigh that started after awaking from surgery yesterday. This is not interfering with ambulation and seems to go away with walking. Patient has been ambulating in halls. She reports no nausea and no vomiting. Bowel Movements: None    Objective:     Blood pressure 108/76, pulse 80, temperature 98.6 °F (37 °C), resp. rate 16, height 5' 8\" (1.727 m), weight (!) 160.1 kg (352 lb 14.4 oz), last menstrual period 12/26/2021, SpO2 95 %. 02/01 0701 - 02/01 1900  In: 50 [I.V.:50]  Out: -     01/30 1901 - 02/01 0700  In: 9720 [P.O.:270;  I.V.:3200]  Out: 1300 [Urine:1250]    EXAM: GENERAL: alert, pleasant, no distress   HEART: regular rate and rhythm   LUNGS: clear to auscultation   ABDOMEN:  Soft, obese, appropriate incisional tenderness, +BS, non-distended, surgical incisions clean, dry, no erythema or drainage   EXTREMITIES: warm, well perfused    Data Review    Recent Results (from the past 24 hour(s))   CBC W/O DIFF    Collection Time: 02/01/22  3:59 AM   Result Value Ref Range    WBC 10.3 4.6 - 13.2 K/uL    RBC 4.17 (L) 4.20 - 5.30 M/uL    HGB 12.0 12.0 - 16.0 g/dL    HCT 36.8 35.0 - 45.0 %    MCV 88.2 78.0 - 100.0 FL    MCH 28.8 24.0 - 34.0 PG    MCHC 32.6 31.0 - 37.0 g/dL    RDW 12.3 11.6 - 14.5 %    PLATELET 317 989 - 592 K/uL    MPV 10.0 9.2 - 11.8 FL    NRBC 0.0 0  WBC    ABSOLUTE NRBC 0.00 0.00 - 5.84 K/uL   METABOLIC PANEL, BASIC    Collection Time: 02/01/22  3:59 AM   Result Value Ref Range    Sodium 136 136 - 145 mmol/L    Potassium 4.5 3.5 - 5.5 mmol/L    Chloride 107 100 - 111 mmol/L    CO2 24 21 - 32 mmol/L    Anion gap 5 3.0 - 18 mmol/L    Glucose 101 (H) 74 - 99 mg/dL    BUN 10 7.0 - 18 MG/DL    Creatinine 0.70 0.6 - 1.3 MG/DL    BUN/Creatinine ratio 14 12 - 20      GFR est AA >60 >60 ml/min/1.73m2    GFR est non-AA >60 >60 ml/min/1.73m2    Calcium 8.8 8.5 - 10.1 MG/DL       Assessment:   Boo Martinez is a 29 y.o. female,  day 1 status post   1. Laparoscopic Mckenna-en-Y gastric bypass utilizing a 15 mL separate tubularized gastric pouch based on the lesser curvature of the stomach, a 115-FM retrocolic/retrogastric Mckenna limb, and a 40-cm biliopancreatic limb. 2.  Laparoscopic left hepatic lobe wedge biopsy.    Condition: good    Plan:   -Ambulate every four hours  -Pain managed prior to d/c   -Nausea managed prior to d/c   -Advance to Clear liquid Gastric Bypass Diet, if able to tolerate clear liquid diet (with pro shake) 4oz per hour for 3 hours prior to d/c    If patient continues to progress d/c home later today     Re Patel NP  8:09 AM  2/1/2022

## 2022-02-01 NOTE — ROUTINE PROCESS
Patient is alert and oriented times three up and walking in the halls no signs or symptoms of distress.  Lap sties are clean dry and intact no nausea or vomiting

## 2022-02-01 NOTE — PROGRESS NOTES
Problem: Pain  Goal: *Control of Pain  Outcome: Progressing Towards Goal     Problem: Patient Education: Go to Patient Education Activity  Goal: Patient/Family Education  Outcome: Progressing Towards Goal     Problem: Falls - Risk of  Goal: *Absence of Falls  Description: Document Jadon Fail Fall Risk and appropriate interventions in the flowsheet.   Outcome: Progressing Towards Goal  Note: Fall Risk Interventions:            Medication Interventions: Assess postural VS orthostatic hypotension,Evaluate medications/consider consulting pharmacy,Patient to call before getting OOB,Teach patient to arise slowly                   Problem: Patient Education: Go to Patient Education Activity  Goal: Patient/Family Education  Outcome: Progressing Towards Goal     Problem: Patient Education: Go to Patient Education Activity  Goal: Patient/Family Education  Outcome: Progressing Towards Goal     Problem: Laparoscopic Gastric Bypass:Day of Surgery  Goal: Off Pathway (Use only if patient is Off Pathway)  Outcome: Progressing Towards Goal  Goal: Activity/Safety  Outcome: Progressing Towards Goal  Goal: Consults, if ordered  Outcome: Progressing Towards Goal  Goal: Diagnostic Test/Procedures  Outcome: Progressing Towards Goal  Goal: Nutrition/Diet  Outcome: Progressing Towards Goal  Goal: Medications  Outcome: Progressing Towards Goal  Goal: Respiratory  Outcome: Progressing Towards Goal  Goal: Treatments/Interventions/Procedures  Outcome: Progressing Towards Goal  Goal: Psychosocial  Outcome: Progressing Towards Goal  Goal: *No signs and symptoms of infection or wound complications  Outcome: Progressing Towards Goal  Goal: *Optimal pain control at patient's stated goal  Outcome: Progressing Towards Goal  Goal: *Adequate urinary output (equal to or greater than 30 milliliters/hour)  Outcome: Progressing Towards Goal  Goal: *Hemodynamically stable  Outcome: Progressing Towards Goal  Goal: *Tolerating diet  Outcome: Progressing Towards Goal  Goal: *Demonstrates progressive activity  Outcome: Progressing Towards Goal  Goal: *Absence of signs and symptoms of DVT  Outcome: Progressing Towards Goal  Goal: *Labs within defined limits  Outcome: Progressing Towards Goal  Goal: *Oxygen saturation within defined limits  Outcome: Progressing Towards Goal     Problem: Laparoscopic Gastric Bypass:Post-Op Day 1  Goal: Off Pathway (Use only if patient is Off Pathway)  Outcome: Progressing Towards Goal  Goal: Activity/Safety  Outcome: Progressing Towards Goal  Goal: Diagnostic Test/Procedures  Outcome: Progressing Towards Goal  Goal: Nutrition/Diet  Outcome: Progressing Towards Goal  Goal: Discharge Planning  Outcome: Progressing Towards Goal  Goal: Medications  Outcome: Progressing Towards Goal  Goal: Respiratory  Outcome: Progressing Towards Goal  Goal: Treatments/Interventions/Procedures  Outcome: Progressing Towards Goal  Goal: Psychosocial  Outcome: Progressing Towards Goal  Goal: *No signs and symptoms of infection or wound complications  Outcome: Progressing Towards Goal  Goal: *Optimal pain control at patient's stated goal  Outcome: Progressing Towards Goal  Goal: *Adequate urinary output (equal to or greater than 30 milliliters/hour)  Outcome: Progressing Towards Goal  Goal: *Hemodynamically stable  Outcome: Progressing Towards Goal  Goal: *Tolerating diet  Outcome: Progressing Towards Goal  Goal: *Demonstrates progressive activity  Outcome: Progressing Towards Goal  Goal: *Absence of signs and symptoms of DVT  Outcome: Progressing Towards Goal  Goal: *Labs within defined limits  Outcome: Progressing Towards Goal  Goal: *Oxygen saturation within defined limits  Outcome: Progressing Towards Goal  Goal: *Upper GI series/No leak identified  Outcome: Progressing Towards Goal     Problem: Laparoscopic Gastric Bypass:Post-Op Day 2  Goal: Off Pathway (Use only if patient is Off Pathway)  Outcome: Progressing Towards Goal  Goal: Activity/Safety  Outcome: Progressing Towards Goal  Goal: Diagnostic Test/Procedures  Outcome: Progressing Towards Goal  Goal: Nutrition/Diet  Outcome: Progressing Towards Goal  Goal: Discharge Planning  Outcome: Progressing Towards Goal  Goal: Medications  Outcome: Progressing Towards Goal  Goal: Respiratory  Outcome: Progressing Towards Goal  Goal: Treatments/Interventions/Procedures  Outcome: Progressing Towards Goal  Goal: Psychosocial  Outcome: Progressing Towards Goal     Problem: Laparoscopic Gastric Bypass:Discharge Outcomes  Goal: *Active bowel sounds  Outcome: Progressing Towards Goal  Goal: *Absence of signs and symptoms of DVT  Outcome: Progressing Towards Goal  Goal: *Hemodynamically stable  Outcome: Progressing Towards Goal  Goal: *Lungs clear or at baseline  Outcome: Progressing Towards Goal  Goal: *Demonstrates independent activity or return to baseline  Outcome: Progressing Towards Goal  Goal: *Optimal pain control at patient's stated goal  Outcome: Progressing Towards Goal  Goal: *Verbalizes understanding and describes prescribed diet  Outcome: Progressing Towards Goal  Goal: *Tolerating diet  Outcome: Progressing Towards Goal  Goal: *Verbalizes name, dosage, time, side effects, and number of days to continue medications  Outcome: Progressing Towards Goal  Goal: *No signs and symptoms of infection or wound complications  Outcome: Progressing Towards Goal  Goal: *Anxiety reduced or absent  Outcome: Progressing Towards Goal  Goal: *Understands and describes signs and symptoms to report to providers (eg: s/s of leak, DVT; inability to tolerate diet)  Outcome: Progressing Towards Goal  Goal: *Describes follow-up/return visits to physicians  Outcome: Progressing Towards Goal  Goal: *Describes available resources and support systems  Outcome: Progressing Towards Goal

## 2022-02-01 NOTE — ROUTINE PROCESS
Patient was educated on all discharge instructions below. He/she understood and was provided a copy. He/she knows who to call for any issues post discharge. Hydration  Hydration is your NUMBER ONE priority. Dehydration is the most common reason for readmission to the hospital. Dehydration occurs when  your body does not get enough fluid to keep it functioning at its best. Your body also requires fluid  to burn its stored fat calories for energy. Carry a bottle of water with you all day, even when you are away from home; remind yourself to  drink even if you dont feel thirsty. Drinking 64 ounces of fluid is your daily goal. You can tell if  youre getting enough fluid if youre making clear, light-colored urine five to 10 times per day. Signs of dehydration can be thirst, headache, hard stools or dizziness upon sitting or standing up. You should contact your surgeons office if you are unable to drink enough fluid to stay hydrated. [facility] after Surgery   No lifting over 15 pounds for four weeks.  No driving while taking the pain medication (about seven to 10 days).  No tub baths, swimming or hot tubs until incisions are healed (about two weeks).  You may shower. Clean incisions daily /gently with soap and check incisions for signs of infection:   Redness around incision.  Swelling at site.  Drainage with an foul odor (pus).  Increase tenderness around incision.  Take your temperature and resting pulse in the morning and evening. Record on tracking form  given to you. Call if your temperature is greater than 101 or your pulse rate is greater than 115.  Please contact your surgeon if you are having excessive abdominal pain (that lasts longer than  four hours and does not improve with prescribed pain medication), vomiting or shortness of breath.  Get up and move  do not sit in one place for more than an hour.    You need to WALK (EXERCISE) for 30 minutes per day.  Walking around your house does not count.  Bike, treadmill and elliptical are OK.  NO weight lifting or sit ups.  If constipated take an adult dose of Miralax (available over the counter). Contact the doctors  office if Miralax doesnt help.  You may swallow pills starting the day after surgery as long as they fit inside this [place]:   Continue to use your incentive spirometer (breathing machine) for the next couple of weeks to  help prevent pneumonia. [address] [email or web address]  Temperature/Heart Rate Log  Take your temperature and heart rate (pulse) twice a day for 14 days. Take both in the morning and  evening at about the same time each day (when you wake up and before you go to bed when you  are relaxed). Please contact your doctors office if your:   Temperature is higher than 101 degrees.  Heart rate (pulse) is higher than 115 beats per minute  (normal heart rate is 60 to 100 beats per minute). How to Take Your Heart Rate (Pulse)   Turn your left hand so that your palm is face-up.  With the index and middle fingers of your right  hand, draw a line from the base of your thumb to  just below the crease in your wrist. Your fingers  should nestle just to the left of the large tendon that  pops up when you bend your wrist toward you.  Dont press too hard, that will make the pulse go  away. Use gentle pressure.  Wait. It can take several seconds  and several micro-adjustments in the placement of your two  fingers on your wrist  to find your pulse. Just keep moving your fingers down or up your wrist  in small increments (and pausing for a few seconds) until you find it. How to Take Your Pulse Rate   Find a watch with a second hand and place it on your right wrist or on the table next  to your left hand.  After finding your pulse, count the number of beats for 20 seconds.    Multiply by three to get your heart rate, or beats per minute  (or just count for 60 seconds for a math-free option).  Normal, resting heart rate is about 60 to 100 beats per minute. Lovenox Self Injection Guide  Prepare  Step 1: Wash and dry your hands thoroughly. Step 2: Sit or lie in a comfortable position and choose an area  on the right or left side of the abdomen at least two inches away  from the belly button. Step 3: Clean the injection site with an alcohol swab and let dry. Inject  Step 4: Remove the needle cap by pulling it straight off the syringe and  discard it in a sharps . Step 5: With your other hand, pinch an inch of the cleansed area to  make a fold in the skin. Insert the full length of the needle straight  down  at a 90? angle  into the fold of skin. [address] [place]  Step 6: Press the plunger with your thumb until the syringe is empty. Then pull the needle straight out and release the skin fold. Dispose  Step 7: Point the needle down and away from yourself and others,  and then push down on the plunger to activate the safety shield. Step 8: Place the used syringe in the sharps . Do NOT expel the air bubble from the syringe before the injection. Administration should be alternated between the left and right abdominal wall. The whole length  of the needle should be introduced into a skin fold held between the thumb and forefinger; the  skin fold should be held throughout the injection. To minimize bruising, do not rub the injection  site after completion of the injection. Questions about LOVENOX? Call [phone]    9. DIET AND LIFESTYLE  Key Diet Principles Following Bariatric Surgery   Begin each meal with soft moist high protein foods (i.e. chicken, turkey, yogurt, tuna, eggs,  cottage cheese, other fish and seafood).  Consume a minimum of 64 ounces of fluid each day to prevent dehydration. No straws.  No food and fluid together.  Stop drinking 30 minutes before a meal. You may begin fluids again  30 minutes after you finish a meal.   Eat very slowly and chew all foods completely.  Keep portions small.  No simple sugars or high fat foods. No carbonated beverages. No caffeine.  Eat three meals per day. No skipping. Avoid snacking between meals.  No alcohol. No smoking.  Two Flintstones Complete Chewable vitamins each day. Take one in the morning and one at night.  1,500 milligrams Calcium Citrate per day in separate dosages.  Vitamin D 3: 5,000 IU taken per day.  Vitamin B-12: Take 1,000 micrograms sublingually daily.  Iron: 60 milligrams per day from [employer].  Protein supplements of your choice. Must be low sugar (0 to 3 grams), low fat (0 to 3 grams) and  provide at least 35 to 40 grams of protein each day. You need 60 to 70 grams of protein (food  and supplements) each day.  Minimum of 30 minutes of physical activity daily. Do not take NSAIDS . Do not take Steroids without your surgeons permission. Your Priorities After Surgery  ? Fluid: 64 ounces of fluid per day. ? Protein: 60 to 70 grams of protein per day. ? Walk every day. Clear Liquid Diet  One week of clear liquids: minimum of 64 ounces of fluid per day. Fluid:   Zero calorie liquids.  No caffeine.  No carbonation.  No sugary drinks.  No alcohol.  No straws. Food   Protein drinks.  Less than 3 grams of sugar and  3 grams of fat per serving.  Protein drink should provide you with  60 to 70 grams of protein. Soft Protein Diet  Five weeks of soft protein (1 ounce for soft protein, 3 ounces of yogurt/cottage cheese). Three meals per day and 1 protein shake. Protein shakes should provide you with 30 grams of  protein on the soft protein diet. Slow transition:   First week on soft protein diet  focus on yogurt, cottage cheese, eggs, vegetarian refried beans,  black beans, kidney beans and white beans.  (NO BAKED BEANS.)   Second through fourth week on soft protein diet  focus on yogurt, cottage cheese, eggs,  canned tuna, canned chicken, tilapia and fish (needs to be soft enough to be cut up with a fork).  Fifth week on soft protein diet  focus on yogurt, cottage cheese, eggs, canned tuna, canned  chicken, tilapia, fish, salmon, chicken breast or turkey. Fluid is your #1 Priority! Continue clear liquids between meals. You will need 64 ounces of fluid per day. Fluids that you can have include:   Water.  Zero calorie liquids. You will need to sip throughout the day and should therefore have a water bottle with you at all  times! No liquids with your meals. Stop 30 minutes before a meal and wait 30 minutes after a meal.ugary drinks. No alco  Protein  You will need 60 to 70 grams of protein per day.  60 to 70 grams of protein shakes when on the clear liquid diet (two to three shakes per day).  30 to 50 grams of protein shakes when on the soft protein diet (one shake per day). Eat Three Times Per Day  You will need to eat three times per day. My planned times are:  _________________________________________________________  _________________________________________________________  _________________________________________________________  Nausea, Vomiting, Stomach Pain  If you have problems with nausea, vomiting or stomach pain, try:   Eating slowly: 20 to 30 minutes per meal.   Chewing food thoroughly: 20 to 30 chews before food is swallowed.  Small portions: measure portions in medicine cup.  Stopping before feeling full.  AVOIDING SUGAR and FRIED FOOD: sugar will cause dumping syndrome and lead to weight gain. Exercise  I will need to get a minimum of 30 minutes of exercise per day or 150 minutes of exercise per week.  Walking, swimming, biking or elliptical.   Find something you enjoy! Vitamins  After surgery, you will need to take the following vitamins for the rest of your life  FOREVER.  Vitamin D 3: 5,000 IU per day.  Calcium Citrate: 1,500 milligrams, taken separately.    Flintstones Complete: two per day, taken separately.  Sublingual Vitamin B-12: 1,000 micrograms daily.  Iron for menstruating women or patients with a history of low iron: 65 milligrams daily. We recommend going to www.bariatricadvantage. com and purchasing iron from there. The lemon-lime has 60 milligrams. This iron is better absorbed than over-the-counter iron. Clear Liquid Log  Getting your fluid in is top priority during this week. Fluids (MINIUM of 64 ounces per day):  ? 8 oz. ? 8 oz. ? 8 oz. ? 8 oz. ? 8 oz. ? 8 oz. ? 8 oz. ? 8 oz. ? 8 oz. ? 8 oz. ? 8 oz. ? 8 oz. Flintstones Complete Chewable: ? a.m. ? p.m. Calcium Citrate (1,500 milligrams/day):  Pill form  ? Two crushed pills (morning) ? Two crushed pills (afternoon) ? Two crushed pills (evening)  OR Upcal D (powder)  ? One pack/scoop ? One pack/scoop ? One pack/scoop  OR Celebrate Chewable Vitamins (500 mg each) or [employer]age Chewables (500 mg)  ? One chewable (morning) ? One chewable (afternoon) ? One chewable (evening)  OR Liquid Calcium Citrate  ? 1 tbsp. Calcium Citrate ? 1 tbsp. Calcium Citrate ? 1 tbsp. Calcium Citrate  Vitamin D3: ? 5,000 IU daily. Vitamin B-12: ? 1,000 micrograms per day. Iron (menstruating women or patients with a history of low iron):  ? 60 milligrams per day from [employer]. Protein drinks (protein drinks should be under 3 grams of sugar and 3 grams of fat). Protein shake (60 grams per day): ? a.m. ? p.m. Exercise: ? 30 to 40 minutes per day. Bariatric Soft and Moist Diet Shopping List   alcium Citrate (1,500 milligrams/day):  Pill form  ? Two crushed pills (morning) ? Two crushed pills (afternoon) ? Two crushed pills (evening)  OR Upcal D (powder)  ? One pack/scoop ? One pack/scoop ? One pack/scoop  OR Celebrate Chewable Vitamins (500 mg each) or [facility] Chewables (500 mg)  ? One chewable (morning) ? One chewable (afternoon) ? One chewable (evening)  OR Liquid Calcium Citrate  ? 1 tbsp. Calcium Citrate ? 1 tbsp. Calcium Citrate ? 1 tbsp. Calcium Citrate  Vitamin D3: ? 5,000 IU daily  Vitamin B-12: ? 1,000 micrograms per day  Iron (menstruating women or  patients with a history of low iron):  ? 60 milligrams per day  from [employer]  Protein drinks (protein drinks should be under  3 grams of sugar and 3 grams of fat). Protein shake (30 to 40 grams per day):  ? a.m. ? p.m. Exercise: ? 30 to 40 minutes per  Educated on Diet Progression    [facility] Gastric Bypass [facility]lee[facility] Dietary Progression    Patient Name:   Date of Surgery: Ice Chips start once admitted on floor. Begin Bariatric Clear Liquid Diet on:     Clear Liquid Diet: 64 oz. of fluid per day  o Low calorie, low sugar, non-carbonated beverages  - Water, Crystal Light, Propel Water, Sugar Free Jell-O, Sugar Free Popsicles, Bouillon  - Start protein supplement during this stage. (60-70 grams per day)  - Getting your fluid in and staying hydrated is your #1 priority! - The clear liquid diet will last for 7 days. Begin Bariatric Soft and Moist on:   - This stage of the diet will last for 5 weeks, unless otherwise instructed by your surgeon. - Begin:  1 week post-op   - End:  6 weeks post-op (or when you follow up with the Registered Dietitian)    - Soft, moist, high protein foods: 3 meals per day plus protein supplements. o   Portions should emphasize on soft protein. o Portions will be a MAXIMUM of:  o  1 ounce of solid food  o  2-3 ounces of cottage cheese and yogurt. o Protein supplements should be between meals and provide 30-40 grams per day during soft protein diet. o Continue to get 64 ounces of fluid in per day. - Protein foods that are ok on the Soft and Moist Diet include:  o Slow transition:  o 1st week on soft protein should focus on:  Yogurt, cottage cheese, eggs  o 2nd -4th  week on soft protein diet should focus on: yogurt, cottage cheese, eggs, canned tuna, canned chicken, tilapia, fish (needs to be soft enough to be cut up with a fork)  o 5th week on soft protein diet should focus on: Yogurt, cottage cheese, eggs, canned tuna, canned chicken, tilapia, fish, salmon, chicken breast, or turkey. Remember to continue to get 64 ounces of fluid daily on ALL Stages. To be advanced to Bariatric Maintenance Stage of the bariatric diet, follow up with the dietitian 6 weeks post-op, around:         For any additional questions, please refer to your blue binder that was provided to you at the start of the bariatric program.

## 2022-02-01 NOTE — PROGRESS NOTES
Problem: Pain  Goal: *Control of Pain  Outcome: Progressing Towards Goal     Problem: Patient Education: Go to Patient Education Activity  Goal: Patient/Family Education  Outcome: Progressing Towards Goal     Problem: Falls - Risk of  Goal: *Absence of Falls  Description: Document Link Sandoval Fall Risk and appropriate interventions in the flowsheet.   Outcome: Progressing Towards Goal  Note: Fall Risk Interventions:            Medication Interventions: Assess postural VS orthostatic hypotension,Evaluate medications/consider consulting pharmacy,Patient to call before getting OOB,Teach patient to arise slowly                   Problem: Patient Education: Go to Patient Education Activity  Goal: Patient/Family Education  Outcome: Progressing Towards Goal     Problem: Patient Education: Go to Patient Education Activity  Goal: Patient/Family Education  Outcome: Progressing Towards Goal     Problem: Laparoscopic Gastric Bypass:Day of Surgery  Goal: Off Pathway (Use only if patient is Off Pathway)  Outcome: Progressing Towards Goal  Goal: Activity/Safety  Outcome: Progressing Towards Goal  Goal: Consults, if ordered  Outcome: Progressing Towards Goal  Goal: Diagnostic Test/Procedures  Outcome: Progressing Towards Goal  Goal: Nutrition/Diet  Outcome: Progressing Towards Goal  Goal: Medications  Outcome: Progressing Towards Goal  Goal: Respiratory  Outcome: Progressing Towards Goal  Goal: Treatments/Interventions/Procedures  Outcome: Progressing Towards Goal  Goal: Psychosocial  Outcome: Progressing Towards Goal  Goal: *No signs and symptoms of infection or wound complications  Outcome: Progressing Towards Goal  Goal: *Optimal pain control at patient's stated goal  Outcome: Progressing Towards Goal  Goal: *Adequate urinary output (equal to or greater than 30 milliliters/hour)  Outcome: Progressing Towards Goal  Goal: *Hemodynamically stable  Outcome: Progressing Towards Goal  Goal: *Tolerating diet  Outcome: Progressing Towards Goal  Goal: *Demonstrates progressive activity  Outcome: Progressing Towards Goal  Goal: *Absence of signs and symptoms of DVT  Outcome: Progressing Towards Goal  Goal: *Labs within defined limits  Outcome: Progressing Towards Goal  Goal: *Oxygen saturation within defined limits  Outcome: Progressing Towards Goal     Problem: Laparoscopic Gastric Bypass:Day of Surgery  Goal: Off Pathway (Use only if patient is Off Pathway)  Outcome: Progressing Towards Goal  Goal: Activity/Safety  Outcome: Progressing Towards Goal  Goal: Consults, if ordered  Outcome: Progressing Towards Goal  Goal: Diagnostic Test/Procedures  Outcome: Progressing Towards Goal  Goal: Nutrition/Diet  Outcome: Progressing Towards Goal  Goal: Medications  Outcome: Progressing Towards Goal  Goal: Respiratory  Outcome: Progressing Towards Goal  Goal: Treatments/Interventions/Procedures  Outcome: Progressing Towards Goal  Goal: Psychosocial  Outcome: Progressing Towards Goal  Goal: *No signs and symptoms of infection or wound complications  Outcome: Progressing Towards Goal  Goal: *Optimal pain control at patient's stated goal  Outcome: Progressing Towards Goal  Goal: *Adequate urinary output (equal to or greater than 30 milliliters/hour)  Outcome: Progressing Towards Goal  Goal: *Hemodynamically stable  Outcome: Progressing Towards Goal  Goal: *Tolerating diet  Outcome: Progressing Towards Goal  Goal: *Demonstrates progressive activity  Outcome: Progressing Towards Goal  Goal: *Absence of signs and symptoms of DVT  Outcome: Progressing Towards Goal  Goal: *Labs within defined limits  Outcome: Progressing Towards Goal  Goal: *Oxygen saturation within defined limits  Outcome: Progressing Towards Goal     Problem: Laparoscopic Gastric Bypass:Post-Op Day 1  Goal: Off Pathway (Use only if patient is Off Pathway)  Outcome: Progressing Towards Goal  Goal: Activity/Safety  Outcome: Progressing Towards Goal  Goal: Diagnostic Test/Procedures  Outcome: Progressing Towards Goal  Goal: Nutrition/Diet  Outcome: Progressing Towards Goal  Goal: Discharge Planning  Outcome: Progressing Towards Goal  Goal: Medications  Outcome: Progressing Towards Goal  Goal: Respiratory  Outcome: Progressing Towards Goal  Goal: Treatments/Interventions/Procedures  Outcome: Progressing Towards Goal  Goal: Psychosocial  Outcome: Progressing Towards Goal  Goal: *No signs and symptoms of infection or wound complications  Outcome: Progressing Towards Goal  Goal: *Optimal pain control at patient's stated goal  Outcome: Progressing Towards Goal  Goal: *Adequate urinary output (equal to or greater than 30 milliliters/hour)  Outcome: Progressing Towards Goal  Goal: *Hemodynamically stable  Outcome: Progressing Towards Goal  Goal: *Tolerating diet  Outcome: Progressing Towards Goal  Goal: *Demonstrates progressive activity  Outcome: Progressing Towards Goal  Goal: *Absence of signs and symptoms of DVT  Outcome: Progressing Towards Goal  Goal: *Labs within defined limits  Outcome: Progressing Towards Goal  Goal: *Oxygen saturation within defined limits  Outcome: Progressing Towards Goal  Goal: *Upper GI series/No leak identified  Outcome: Progressing Towards Goal     Problem: Laparoscopic Gastric Bypass:Post-Op Day 2  Goal: Off Pathway (Use only if patient is Off Pathway)  Outcome: Progressing Towards Goal  Goal: Activity/Safety  Outcome: Progressing Towards Goal  Goal: Diagnostic Test/Procedures  Outcome: Progressing Towards Goal  Goal: Nutrition/Diet  Outcome: Progressing Towards Goal  Goal: Discharge Planning  Outcome: Progressing Towards Goal  Goal: Medications  Outcome: Progressing Towards Goal  Goal: Respiratory  Outcome: Progressing Towards Goal  Goal: Treatments/Interventions/Procedures  Outcome: Progressing Towards Goal  Goal: Psychosocial  Outcome: Progressing Towards Goal     Problem: Laparoscopic Gastric Bypass:Discharge Outcomes  Goal: *Active bowel sounds  Outcome: Progressing Towards Goal  Goal: *Absence of signs and symptoms of DVT  Outcome: Progressing Towards Goal  Goal: *Hemodynamically stable  Outcome: Progressing Towards Goal  Goal: *Lungs clear or at baseline  Outcome: Progressing Towards Goal  Goal: *Demonstrates independent activity or return to baseline  Outcome: Progressing Towards Goal  Goal: *Optimal pain control at patient's stated goal  Outcome: Progressing Towards Goal  Goal: *Verbalizes understanding and describes prescribed diet  Outcome: Progressing Towards Goal  Goal: *Tolerating diet  Outcome: Progressing Towards Goal  Goal: *Verbalizes name, dosage, time, side effects, and number of days to continue medications  Outcome: Progressing Towards Goal  Goal: *No signs and symptoms of infection or wound complications  Outcome: Progressing Towards Goal  Goal: *Anxiety reduced or absent  Outcome: Progressing Towards Goal  Goal: *Understands and describes signs and symptoms to report to providers (eg: s/s of leak, DVT; inability to tolerate diet)  Outcome: Progressing Towards Goal  Goal: *Describes follow-up/return visits to physicians  Outcome: Progressing Towards Goal  Goal: *Describes available resources and support systems  Outcome: Progressing Towards Goal     Problem: Laparoscopic Gastric Bypass:Discharge Outcomes  Goal: *Active bowel sounds  Outcome: Progressing Towards Goal  Goal: *Absence of signs and symptoms of DVT  Outcome: Progressing Towards Goal  Goal: *Hemodynamically stable  Outcome: Progressing Towards Goal  Goal: *Lungs clear or at baseline  Outcome: Progressing Towards Goal  Goal: *Demonstrates independent activity or return to baseline  Outcome: Progressing Towards Goal  Goal: *Optimal pain control at patient's stated goal  Outcome: Progressing Towards Goal  Goal: *Verbalizes understanding and describes prescribed diet  Outcome: Progressing Towards Goal  Goal: *Tolerating diet  Outcome: Progressing Towards Goal  Goal: *Verbalizes name, dosage, time, side effects, and number of days to continue medications  Outcome: Progressing Towards Goal  Goal: *No signs and symptoms of infection or wound complications  Outcome: Progressing Towards Goal  Goal: *Anxiety reduced or absent  Outcome: Progressing Towards Goal  Goal: *Understands and describes signs and symptoms to report to providers (eg: s/s of leak, DVT; inability to tolerate diet)  Outcome: Progressing Towards Goal  Goal: *Describes follow-up/return visits to physicians  Outcome: Progressing Towards Goal  Goal: *Describes available resources and support systems  Outcome: Progressing Towards Goal

## 2022-02-01 NOTE — PROGRESS NOTES
Reason for Admission:  Morbid obesity ([facility] [address] [E66.01]  Morbid obesity with BMI of 50.0-59.9, adult ([facility] [address] [E66.01, Z68.43]                 RUR Score:    3%            Plan for utilizing home health:    no                      Likelihood of Readmission:   LOW                         Transition of Care Plan:              Initial assessment completed with patient. Cognitive status of patient: oriented to time, place, person and situation. Face sheet information confirmed:  yes. The patient designates spouse to participate in her discharge plan and to receive any needed information. This patient lives in a single family home with . Patient is able to navigate steps as needed. Prior to hospitalization, patient was considered to be independent with ADLs/IADLS : yes . Patient has a current ACP document on file: no      Healthcare Decision Maker:     Click here to complete [address] including selection of the Healthcare Decision Maker Relationship (ie \"Primary\")    The  will be available to transport patient home upon discharge. The patient has no DME available in the home. Patient is not currently active with home health. Patient has not stayed in a skilled nursing facility or rehab. This patient is on dialysis :no    Currently, the discharge plan is Home. The patient states that she can obtain her medications from the pharmacy, and take her medications as directed. Patient's current insurance is [place]. Care Management Interventions  PCP Verified by CM:  Yes  Mode of Transport at Discharge: Self  Transition of Care Consult (CM Consult): Discharge Planning  Support Systems: Spouse/Significant Other  Confirm Follow Up Transport: Family  Discharge Location  Patient Expects to be Discharged to[de-identified] Home        Tyshawn Beltran RN - Outcomes Manager  [phone]

## 2022-02-01 NOTE — PROGRESS NOTES
Discharge order noted for today. Orders reviewed. No needs identified at this time.  remains available if needed.   Florina Lackey RN - Outcomes Manager  [phone]

## 2022-02-01 NOTE — ROUTINE PROCESS
Patient is alert and oriented times three with no signs or symptoms of distress.  Lap sites remaind clean dry and intact and no nausea or vomiting

## 2022-02-01 NOTE — ROUTINE PROCESS
Bedside shift change report given to miri bain (oncoming nurse) by Cecille Gonzalez (offgoing nurse). Report included the following information SBAR and Kardex.

## 2022-02-01 NOTE — DISCHARGE SUMMARY
Bariatric Surgery Discharge Progress Note    Admission Date: 1/31/2022    Discharge Date: 2/1/2022    PREOPERATIVE DIAGNOSES:  1. 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 2.  Suspected hepatic steatosis.     POSTOPERATIVE DIAGNOSES:  1. Super obesity with body mass index of 51 with obesity-related comorbidities of gastroesophageal reflux disease, stress urinary incontinence, polycystic ovarian syndrome, clinical obstructive sleep apnea, plantar fascitis, weight-related arthropathy. 2.  Hepatomegaly/hepatic steatosis.     PROCEDURES PERFORMED:  1. Laparoscopic Mckenna-en-Y gastric bypass utilizing a 15 mL separate tubularized gastric pouch based on the lesser curvature of the stomach, a 384-YV retrocolic/retrogastric Mckenna limb, and a 40-cm biliopancreatic limb. 2.  Laparoscopic left hepatic lobe wedge biopsy. Postop Complications: none    Hospital Course:  Patient was admitted on 1/31/2022 for scheduled bariatric surgery. Operation was without significant complication. Patient admitted to the floor postoperatively, monitored as per protocol. Diet sequentially advanced beginning POD 1, pain medications transitioned to oral during the hospital course. Currently the patient is afebrile, vital signs stable, tolerating a clear liquid diet with protein supplementation, voiding spontaneously, ambulatory with adequate pain control with oral medications and clear surgical sites without evidence of infection.     Discharge Diet:  Clear Liquid Bariatric Diet for 7 days, then soft moist protein diet for 5 weeks    Discharge Medications:  Current Discharge Medication List      START taking these medications    Details   enoxaparin (LOVENOX) 40 mg/0.4 mL 0.4 mL by SubCUTAneous route every twenty-four (24) hours.  Qty: 7 Each, Refills: 0  Start date: 2/1/2022      oxyCODONE IR (ROXICODONE) 5 mg immediate release tablet Take 1 Tablet by mouth every six (6) hours as needed for Pain for up to 3 days. Max Daily Amount: 20 mg.  Qty: 10 Tablet, Refills: 0  Start date: 2/1/2022, End date: 2/4/2022    Associated Diagnoses: Post-op pain      ondansetron (ZOFRAN ODT) 4 mg disintegrating tablet Take 1 Tablet by mouth every eight (8) hours as needed for Nausea or Vomiting. Qty: 12 Tablet, Refills: 0  Start date: 2/1/2022         CONTINUE these medications which have NOT CHANGED    Details   cholecalciferol (Vitamin D3) (5000 Units/125 mcg) tab tablet Take 5,000 Units by mouth daily.                Bariatric Chewable vitamins, 2 orally daily for life  Calcium Citrate 1500 mg orally daily for life  Vitamin B12 1000 micrograms sublingual daily for life  Vitamin D3 5,000 units orally daily for life   Vitamin B1 100 mg orally daily for life    Discharge disposition: home    Discharge condition: stable      Local wound care with daily showers, keep wounds clean and dry     Activity: as desired, no lifting, pushing, or pulling greater than 15lbs or situps for 30 days     Special Instructions:            - No driving until activity is not influenced by incisional pain and off narcotics            - No bath or hot tub until wounds are healed            - Check pulse and temperature twice daily for 10 days            - Notify [facility] for a Temp >100.5 or Pulse>115     Follow-up with your surgeon in 2 weeks, call office for appointment [phone] ([address] [place] [address]) [phone]([id number] [address] [place] [address] Road)

## 2022-02-17 ENCOUNTER — OFFICE VISIT (OUTPATIENT)
Dept: SURGERY | Age: 37
End: 2022-02-17
Payer: COMMERCIAL

## 2022-02-17 VITALS
WEIGHT: 293 LBS | SYSTOLIC BLOOD PRESSURE: 122 MMHG | DIASTOLIC BLOOD PRESSURE: 84 MMHG | BODY MASS INDEX: 44.41 KG/M2 | RESPIRATION RATE: 16 BRPM | HEIGHT: 68 IN | TEMPERATURE: 97.7 F | OXYGEN SATURATION: 95 % | HEART RATE: 80 BPM

## 2022-02-17 DIAGNOSIS — K91.2 POSTOPERATIVE INTESTINAL MALABSORPTION: Primary | ICD-10-CM

## 2022-02-17 PROCEDURE — 99024 POSTOP FOLLOW-UP VISIT: CPT | Performed by: SURGERY

## 2022-02-17 RX ORDER — ERGOCALCIFEROL 1.25 MG/1
50000 CAPSULE ORAL
COMMUNITY
End: 2022-03-03

## 2022-02-17 RX ORDER — LANOLIN ALCOHOL/MO/W.PET/CERES
CREAM (GRAM) TOPICAL DAILY
COMMUNITY

## 2022-02-17 RX ORDER — IBUPROFEN 200 MG
CAPSULE ORAL DAILY
COMMUNITY

## 2022-02-17 RX ORDER — UREA 10 %
100 LOTION (ML) TOPICAL DAILY
COMMUNITY

## 2022-02-17 NOTE — PROGRESS NOTES
Bariatric Follow-Up Evaluation    Idalia Harris is a 29 y.o. white female status post laparoscopic gastric bypass January 31, 2022 who presents in follow-up noting expected decreasing incisional discomfort no longer requiring analgesics and otherwise without complaint. Tolerant of and compliant with an early post gastric bypass diet meeting both protein and fluid supplementation goals. The patient has appropriate early satiety with no specific food intolerances or pathologic emesis has not asked experienced dumping syndrome and she has not attempted high-density carbohydrates. Compliant with multivitamin, calcium citrate, vitamin B1, vitamin B12 and vitamin D supplementation. Exercise regimen: Walking 30 minutes on daily basis  Comorbidities: Gastroesophageal reflux disease, stress urinary incontinence, clinical obstructive sleep apnearesolved                           Weight related arthropathy, Planter fasciitis improved                           PCOSwithout change  Preoperative weight: 332  Current weight: 313. BMI: 48.  Estimated loss: 154  Current loss: 23  Postsurgical goal weight: 182  Percentage weight loss: 15% / 17 days      Weight Loss Metrics 2/17/2022 2/17/2022 1/31/2022 1/5/2022 12/22/2021 11/26/2021 11/26/2021   Pre op / Initial Wt 336 - - - - 332 -   Today's Wt - 313 lb 352 lb 14.4 oz - 332 lb - 332 lb   BMI - 47.59 kg/m2 53.66 kg/m2 50.48 kg/m2 - - 50.48 kg/m2   Ideal Body Wt 143 - - - - 143 -   Excess Body Wt 193 - - - - 189 -   Goal Wt - - - - - 181 -   Wt loss to date 23 - - - - 0 -   % Wt Loss 0.15 - - - - 0 -   80% .4 - - - - 151.2 -       Allergies   Allergen Reactions    Augmentin [Amoxicillin-Pot Clavulanate] Nausea and Vomiting       Current Outpatient Medications on File Prior to Visit   Medication Sig Dispense Refill    multivitamin with iron (FLINTSTONES) chewable tablet Take 1 Tablet by mouth daily.       ergocalciferol (Vitamin D2) 1,250 mcg (50,000 unit) capsule Take 50,000 Units by mouth.  calcium citrate 200 mg (950 mg) tablet Take  by mouth daily.  cyanocobalamin (Vitamin B-12) 100 mcg tablet Take 100 mcg by mouth daily.  thiamine HCL (Vitamin B-1) 100 mg tablet Take  by mouth daily.  cholecalciferol (Vitamin D3) (5000 Units/125 mcg) tab tablet Take 5,000 Units by mouth daily.  enoxaparin (LOVENOX) 40 mg/0.4 mL 0.4 mL by SubCUTAneous route every twenty-four (24) hours. (Patient not taking: Reported on 2/17/2022) 7 Each 0    ondansetron (ZOFRAN ODT) 4 mg disintegrating tablet Take 1 Tablet by mouth every eight (8) hours as needed for Nausea or Vomiting. (Patient not taking: Reported on 2/17/2022) 12 Tablet 0     No current facility-administered medications on file prior to visit. Past Medical History:   Diagnosis Date    COVID-19 01/05/2022    no symptoms    Diabetes ([address]     Pre DM    Obesity (BMI 35.0-39.9 without comorbidity)     PCOS (polycystic ovarian syndrome)     Plantar fasciitis        Past Surgical History:   Procedure Laterality Date    HX GI  01/31/2022    LGBP    HX GYN  2020    polypectomy uterus     HX TONSILLECTOMY         Social History     Tobacco Use    Smoking status: Never Smoker    Smokeless tobacco: Never Used   Vaping Use    Vaping Use: Never used   Substance Use Topics    Alcohol use: Not Currently     Alcohol/week: 1.0 standard drink     Types: 1 Glasses of wine per week     Comment: socially    Drug use: Never       Family History   Problem Relation Age of Onset    Thyroid Disease Mother     OSTEOARTHRITIS Mother     Obesity Mother     Sleep Apnea Mother     Heart Attack Mother 36    Hypertension Father     Thyroid Disease Father     Sleep Apnea Brother     Obesity Brother        ROS:  General: Negative for fevers, chills, night sweats, fatigue, weight loss, or weight gain. GI: Negative for abdominal pain, change in bowel habits, hematochezia, melena, or GERD.     Integumentary: Negative for dermatitis or abnormal moles. HEENT: Negative for visual changes, vertigo, epistaxis, dysphagia, or hoarseness    Cardiac: Negative for chest pain, palpitations, hypertension, edema, or varicosities    Resp: Negative for cough, shortness of breath, wheezing, hemoptysis, snoring, or reactive airway disease    : Negative for hematuria, dysuria, frequency, urgency, nocturia, or stress urinary incontinence    MSK:  Negative for weakness, joint pain, or arthritis    Endocrine: Negative for diabetes, thyroid disease, polyuria, polydipsia, polyphagia, poor wound, heat intolerance, or cold intolerance. Lymph/Heme: Negative for anemia, bruising, or history of blood transfusions. Neuro:  Negative for dizziness, headache, fainting, seizures, and stroke. Psychiatry:  Negative for anxiety or depression    Physical Exam    Visit Vitals  /84   Pulse 80   Temp 97.7 °F (36.5 °C)   Resp 16   Ht 5' 8\" (1.727 m)   Wt 142 kg (313 lb)   SpO2 95%   BMI 47.59 kg/m²       Nursing note reviewed. General: 29 y.o. female is in no acute distress. Head: Normocephalic, atraumatic  Resp: Clear to auscultation bilaterally, no wheezing, rhonchi, or rales, excursions normal and symmetrical.  Cardio: Regular rate and rhythm, no murmurs, clicks, gallops, or rubs. No edema or varicosities. Abdomen: Obese, soft, nontender, nondistended, normoactive bowel sounds, wounds clean dry approximated with appropriate surrounding induration incisional tenderness without evidence of infectious complications or incisional hernia  Psych: Alert and oriented to person, place, and time.     January 31, 2022 liver biopsy/pathology: Severe steatosis without fibrosis    Impression      Status post laparoscopic gastric bypass January 31, 2022 with appropriate weight loss and comorbidity resolution    Plan    Formal bariatric nutrition evaluation for assistance with advancement of diet  Continue compliance with early post gastric bypass diet, vitamin supplementation protocol, exercise regimen, encourage monthly attendance at bariatric support group meetings  Follow-up April 2022 with 3-month labs for ongoing bariatric surgical evaluation

## 2022-02-17 NOTE — PROGRESS NOTES
Pt ID confirmed    Weight Loss Metrics 2/17/2022 2/17/2022 1/31/2022 1/5/2022 12/22/2021 11/26/2021 11/26/2021   Pre op / Initial Wt 336 - - - - 332 -   Today's Wt - 313 lb 352 lb 14.4 oz - 332 lb - 332 lb   BMI - 47.59 kg/m2 53.66 kg/m2 50.48 kg/m2 - - 50.48 kg/m2   Ideal Body Wt 143 - - - - 143 -   Excess Body Wt 193 - - - - 189 -   Goal Wt - - - - - 181 -   Wt loss to date 23 - - - - 0 -   % Wt Loss 0.15 - - - - 0 -   80% .4 - - - - 151.2 -       Body mass index is 47.59 kg/m². 1. Have you been to the ER, urgent care clinic since your last visit? Hospitalized since your last visit? No    2. Have you seen or consulted any other health care providers outside of the [address] [place] [address] since your last visit? Include any pap smears or colon screening.  No

## 2022-03-02 ENCOUNTER — TELEPHONE (OUTPATIENT)
Dept: SURGERY | Age: 37
End: 2022-03-02

## 2022-03-02 NOTE — TELEPHONE ENCOUNTER
Patient states had LGBP 01/31/22. Patient states randomly felt pain on right side of abd  near naval on Saturday 02/26/22. Patient states pain has not gone away and getting more uncomfortable everyday. Patient states nothing that she can think of if triggering pain. Patient states she does not have issues with constipation. States she has bowel movement regularly. Patient states she drinks 50-60 oz of fluids a day including 1 protein shake a day. Patient states protein is6-8 grams a day includes eggs, chicken, and yogurt. Patient exercises every day 2- 15 min walks a day, and 1 30-6-min walk on weekends. Patient denies fever, SOB, chest pain. I advised patient she may need an appt to be evaluated and I will forward message to [facility], NP. Patient verbalized understanding.

## 2022-03-03 ENCOUNTER — OFFICE VISIT (OUTPATIENT)
Dept: SURGERY | Age: 37
End: 2022-03-03
Payer: COMMERCIAL

## 2022-03-03 VITALS
HEIGHT: 68 IN | TEMPERATURE: 97.9 F | SYSTOLIC BLOOD PRESSURE: 104 MMHG | OXYGEN SATURATION: 96 % | HEART RATE: 81 BPM | WEIGHT: 293 LBS | BODY MASS INDEX: 44.41 KG/M2 | DIASTOLIC BLOOD PRESSURE: 73 MMHG

## 2022-03-03 DIAGNOSIS — Z98.84 S/P GASTRIC BYPASS: Primary | ICD-10-CM

## 2022-03-03 DIAGNOSIS — E66.01 MORBID OBESITY (HCC): ICD-10-CM

## 2022-03-03 DIAGNOSIS — K91.2 POST-RESECTION MALABSORPTION: ICD-10-CM

## 2022-03-03 DIAGNOSIS — R10.33 PERIUMBILICAL ABDOMINAL PAIN: ICD-10-CM

## 2022-03-03 PROCEDURE — 99024 POSTOP FOLLOW-UP VISIT: CPT | Performed by: NURSE PRACTITIONER

## 2022-03-03 NOTE — PROGRESS NOTES
Bariatric Postoperative Progress Note    CC: Abd Pain    Lea Byrnes is a 29 y.o. female now 1 month status post laparoscopic gastric bypass surgery performed on 1/31/2022. Doing well overall. Currently eating eggs, yogurt, chicken, black beans, protein shakes. Taking in 60 oz water,  60 g protein. 30 min of activity 7 days a week. Bowel movements are regular. Pain is controlled without any medications. She is compliant with multivitamins, calcium, Vit D and B12 supplements. Reporting abd pain to right side of umbilicus that started 5 days ago. It is a pulling sensation while bending over or other movements. When not moving, it is a dull pressure. Not related to PO intake or BMs. Weight Loss Metrics 3/3/2022 3/3/2022 2/17/2022 2/17/2022 1/31/2022 1/5/2022 12/22/2021   Pre op / Initial Wt 332 - 336 - - - -   Today's Wt - 306 lb - 313 lb 352 lb 14.4 oz - 332 lb   BMI - 46.53 kg/m2 - 47.59 kg/m2 53.66 kg/m2 50.48 kg/m2 -   Ideal Body Wt 143 - 143 - - - -   Excess Body Wt 189 - 193 - - - -   Goal Wt 181 - - - - - -   Wt loss to date 26 - 23 - - - -   % Wt Loss 0.17 - 0.15 - - - -   80% .2 - 154.4 - - - -       Past Medical History:   Diagnosis Date    COVID-19 01/05/2022    no symptoms    Diabetes (HCC)     Pre DM    Obesity (BMI 35.0-39.9 without comorbidity)     PCOS (polycystic ovarian syndrome)     Plantar fasciitis        Past Surgical History:   Procedure Laterality Date    HX GI  01/31/2022    LGBP    HX GYN  2020    polypectomy uterus     HX TONSILLECTOMY         Current Outpatient Medications   Medication Sig Dispense Refill    multivitamin with iron (FLINTSTONES) chewable tablet Take 1 Tablet by mouth daily.  calcium citrate 200 mg (950 mg) tablet Take  by mouth daily.  cyanocobalamin (Vitamin B-12) 100 mcg tablet Take 100 mcg by mouth daily.  thiamine HCL (Vitamin B-1) 100 mg tablet Take  by mouth daily.       cholecalciferol (Vitamin D3) (5000 Units/125 mcg) tab tablet Take 5,000 Units by mouth daily. Allergies   Allergen Reactions    Augmentin [Amoxicillin-Pot Clavulanate] Nausea and Vomiting       ROS:  Review of Systems   Constitutional: Positive for weight loss. Respiratory: Negative. Cardiovascular: Negative. Gastrointestinal: Positive for abdominal pain. Negative for blood in stool, constipation, diarrhea, heartburn, melena, nausea and vomiting. Genitourinary: Negative. Neurological: Negative for dizziness and headaches. Physicial Exam:  Visit Vitals  /73 (BP 1 Location: Right arm, BP Patient Position: Sitting)   Pulse 81   Temp 97.9 °F (36.6 °C)   Ht 5' 8\" (1.727 m)   Wt 138.8 kg (306 lb)   SpO2 96%   BMI 46.53 kg/m²     Physical Exam  Vitals and nursing note reviewed. Constitutional:       Appearance: She is obese. HENT:      Head: Normocephalic and atraumatic. Pulmonary:      Effort: Pulmonary effort is normal.   Abdominal:      General: Abdomen is flat. Bowel sounds are normal. There is no distension. Palpations: Abdomen is soft. There is mass (Approx 2 cm round movable mass right of umbilicus). Tenderness: There is abdominal tenderness. Hernia: No hernia is present. Musculoskeletal:         General: Normal range of motion. Neurological:      General: No focal deficit present. Mental Status: She is alert and oriented to person, place, and time. Psychiatric:         Mood and Affect: Mood normal.         Behavior: Behavior normal.         Labs:   No results found for this or any previous visit (from the past 672 hour(s)). Assessment/Plan:   She is currently 1 month s/p laparoscopic gastric bypass surgery with a total weight loss of 26 lbs to date, doing well. Abd pain and mass most likely scar tissue from umbilical lap site. Can use tylenol and heat/cold to assist with discomfort. If pain worsens, can order CT to eval further.    We have reviewed the components of a successful postoperative course including requirement for a high protein, low carbohydrate diet, 64 oz a day of zero calorie liquids, daily vitamin supplementation, daily exercise (150 mis/week), regular follow-up, and participation in support groups. Refer to registered dietitian for more detailed medical nutrition therapy as needed. The primary encounter diagnosis was S/P gastric bypass. Diagnoses of Post-resection malabsorption, Morbid obesity ([address] BMI 45.0-49.9, adult ([address] and Periumbilical abdominal pain were also pertinent to this visit. Follow-up and Dispositions    · Return in about 2 months (around 5/3/2022) for 3 Month LGBP Follow Up.       with labs, sooner as needed.   Юлия Powell, NP

## 2022-03-11 ENCOUNTER — DOCUMENTATION ONLY (OUTPATIENT)
Dept: BARIATRICS/WEIGHT MGMT | Age: 37
End: 2022-03-11

## 2022-03-11 ENCOUNTER — HOSPITAL ENCOUNTER (OUTPATIENT)
Dept: BARIATRICS/WEIGHT MGMT | Age: 37
Discharge: HOME | End: 2022-03-11

## 2022-03-11 NOTE — PROGRESS NOTES
[facility]  POST-OP NUTRITION FOLLOW UP    Patient's Name: Rosa Abdi  YOB: 1985  Surgery Date: 22      Procedure: Gastric Bypass    Surgeon: Dr. [facility]    Height: 5 f 8     Pre-Op Weight: 336     Current Weight: 298  Weight Lost: 38    BMI:  45.4  % EBW lost: 25%    Attendance of support group:   When:   Why not:     Complications  Readmittance: None  Reoperations: None  Complications: None  IV Fluids: None  ER Trips: None    Problem Areas:   Nausea: None   Vomiting: None   Dumping Syndrome: None  Inadequate Protein: None, patient states she is getting 1 shake per day. Inadequate Fluids: None, patient states between the protein shakes and drink, yes she does  Food Intolerance:   Hunger: Sometimes. Constipation: None    Eating 3 Meals/Day: Yes  Portion Size at Meals: 1-1.5 ounces     Protein from Food: Yes    Foods being consumed:  Breakfast: Time:    Egg  Lunch: Time: canned chicken salad     Dinner:  Time: yogurt (2 ounces) or chicken     In-between eating: None    Length of time for meals: 18 minutes or longer . She aims for 20-25 minutes    food/fluids: 30/30    Fluids: 64 oz/day   Types of Fluids: water, protein shakes    MVI:  Murray Complete    Number/Day: bid   Taken Separately: Yes    Vitamin B1:  Yes  Dosin mg    Calcium: Citrate chewables    Calcium Dosin mg    Taken Separately: Yes    Vitamin B12: Sublingual    Vitamin B12 Dosin mcg    Vitamin D: yes     Vitamin D dosin IU    Iron: Not taking at this time. Will assess 3 month labs    Iron dosing:      Protein Supplement: Premier mixed with unsweetened almond milk    Grams of Protein: 30   Mixed with:      Splitting Protein Drink in /2:    Timing of Protein Drinks:   Patient is taking 7 days a week. Exercise: walking 30 minutes, 5-7 days a week      Comments:  Patient is doing well at 6 weeks post op. Weight loss is on track. Portions are appropriate. Compliant with vitamins and protein shakes. She is getting adequate fluid in. Exercise level is good. Will monitor her progress. Patient was educated on the importance of eating meat and vegetables only. I have talked with patient about the effects of carbohydrates, not only from a weight management perspective, but also what effects it could have on their blood sugar and what reactive hypoglycemia is.         Diet Follow Up:  9 month class scheduled for September 20 at 3:15 pm      Marco Brenner. [address]    3/11/2022

## 2022-04-29 ENCOUNTER — HOSPITAL ENCOUNTER (OUTPATIENT)
Dept: LAB | Age: 37
Discharge: HOME | End: 2022-04-29
Payer: COMMERCIAL

## 2022-04-29 DIAGNOSIS — K91.2 POST-RESECTION MALABSORPTION: ICD-10-CM

## 2022-04-29 DIAGNOSIS — Z98.84 S/P GASTRIC BYPASS: ICD-10-CM

## 2022-04-29 DIAGNOSIS — R10.33 PERIUMBILICAL ABDOMINAL PAIN: ICD-10-CM

## 2022-04-29 DIAGNOSIS — E66.01 MORBID OBESITY (HCC): ICD-10-CM

## 2022-04-29 LAB
25(OH)D3 SERPL-MCNC: 64.9 NG/ML (ref 30–100)
ALBUMIN SERPL-MCNC: 4.2 G/DL (ref 3.4–5)
ALBUMIN/GLOB SERPL: 1.4 {RATIO} (ref 0.8–1.7)
ALP SERPL-CCNC: 66 U/L (ref 45–117)
ALT SERPL-CCNC: 41 U/L (ref 13–56)
ANION GAP SERPL CALC-SCNC: 7 MMOL/L (ref 3–18)
AST SERPL-CCNC: 21 U/L (ref 10–38)
BILIRUB SERPL-MCNC: 0.6 MG/DL (ref 0.2–1)
BUN SERPL-MCNC: 13 MG/DL (ref 7–18)
BUN/CREAT SERPL: 18 (ref 12–20)
CALCIUM SERPL-MCNC: 9.7 MG/DL (ref 8.5–10.1)
CHLORIDE SERPL-SCNC: 109 MMOL/L (ref 100–111)
CO2 SERPL-SCNC: 25 MMOL/L (ref 21–32)
CREAT SERPL-MCNC: 0.72 MG/DL (ref 0.6–1.3)
ERYTHROCYTE [DISTWIDTH] IN BLOOD BY AUTOMATED COUNT: 12.9 % (ref 11.6–14.5)
FERRITIN SERPL-MCNC: 51 NG/ML (ref 8–388)
FOLATE SERPL-MCNC: >20 NG/ML (ref 3.1–17.5)
GLOBULIN SER CALC-MCNC: 3 G/DL (ref 2–4)
GLUCOSE SERPL-MCNC: 85 MG/DL (ref 74–99)
HCT VFR BLD AUTO: 39.5 % (ref 35–45)
HGB BLD-MCNC: 13.1 G/DL (ref 12–16)
IRON SERPL-MCNC: 75 UG/DL (ref 50–175)
MCH RBC QN AUTO: 30.3 PG (ref 24–34)
MCHC RBC AUTO-ENTMCNC: 33.2 G/DL (ref 31–37)
MCV RBC AUTO: 91.2 FL (ref 78–100)
NRBC # BLD: 0 K/UL (ref 0–0.01)
NRBC BLD-RTO: 0 PER 100 WBC
PLATELET # BLD AUTO: 224 K/UL (ref 135–420)
PMV BLD AUTO: 11.7 FL (ref 9.2–11.8)
POTASSIUM SERPL-SCNC: 3.9 MMOL/L (ref 3.5–5.5)
PROT SERPL-MCNC: 7.2 G/DL (ref 6.4–8.2)
RBC # BLD AUTO: 4.33 M/UL (ref 4.2–5.3)
SODIUM SERPL-SCNC: 141 MMOL/L (ref 136–145)
VIT B12 SERPL-MCNC: 689 PG/ML (ref 211–911)
WBC # BLD AUTO: 4.3 K/UL (ref 4.6–13.2)

## 2022-04-29 PROCEDURE — 85027 COMPLETE CBC AUTOMATED: CPT

## 2022-04-29 PROCEDURE — 84425 ASSAY OF VITAMIN B-1: CPT

## 2022-04-29 PROCEDURE — 36415 COLL VENOUS BLD VENIPUNCTURE: CPT

## 2022-04-29 PROCEDURE — 82607 VITAMIN B-12: CPT

## 2022-04-29 PROCEDURE — 80053 COMPREHEN METABOLIC PANEL: CPT

## 2022-04-29 PROCEDURE — 83540 ASSAY OF IRON: CPT

## 2022-04-29 PROCEDURE — 82728 ASSAY OF FERRITIN: CPT

## 2022-04-29 PROCEDURE — 82306 VITAMIN D 25 HYDROXY: CPT

## 2022-05-05 ENCOUNTER — OFFICE VISIT (OUTPATIENT)
Dept: SURGERY | Age: 37
End: 2022-05-05
Payer: COMMERCIAL

## 2022-05-05 VITALS
HEART RATE: 71 BPM | TEMPERATURE: 97.6 F | DIASTOLIC BLOOD PRESSURE: 81 MMHG | SYSTOLIC BLOOD PRESSURE: 103 MMHG | HEIGHT: 68 IN | WEIGHT: 280 LBS | BODY MASS INDEX: 42.44 KG/M2 | OXYGEN SATURATION: 95 %

## 2022-05-05 DIAGNOSIS — E66.01 MORBID OBESITY (HCC): ICD-10-CM

## 2022-05-05 DIAGNOSIS — Z98.84 S/P GASTRIC BYPASS: ICD-10-CM

## 2022-05-05 DIAGNOSIS — K91.2 POST-RESECTION MALABSORPTION: Primary | ICD-10-CM

## 2022-05-05 PROCEDURE — 99213 OFFICE O/P EST LOW 20 MIN: CPT | Performed by: NURSE PRACTITIONER

## 2022-05-05 NOTE — PROGRESS NOTES
Bariatric Postoperative Progress Note    Chief Complaint   Patient presents with    Follow-up     3 mo follow up/LGBP 1/31/21     Julianna Ramirez is a 29 y.o. female now 3 months status post laparoscopic gastric bypass surgery performed on 1/31/22. Pt reports doing well. She is currently drinking protein shakes, and eating eggs, yogurt, beans, chicken, turkey, beef, peppers, zucchini, onions, squash, carrots, peas, blueberries, bananas, cheese, and occ potatoes . Taking in 60 oz water,  60 g protein. 30 min of activity 3-4 days a week. Bowel movements are regular. She is not experiencing any pain. She is compliant with multivitamins, calcium, Vit D and B12 supplements. Pt reports mild hair loss. Denies skin irritation under breasts and pannus but does have some incisional itching. States she experiences numbness and tingling to her L lateral thigh, right above her knee. Says it was worse after surgery but is much improved. Denies injury, pain, swelling, or redness.      Weight Loss Metrics 5/5/2022 5/5/2022 3/3/2022 3/3/2022 2/17/2022 2/17/2022 1/31/2022   Pre op / Initial Wt 332 - 332 - 336 - -   Today's Wt - 280 lb - 306 lb - 313 lb 352 lb 14.4 oz   BMI - 42.57 kg/m2 - 46.53 kg/m2 - 47.59 kg/m2 53.66 kg/m2   Ideal Body Wt 143 - 143 - 143 - -   Excess Body Wt 189 - 189 - 193 - -   Goal Wt 181 - 181 - - - -   Wt loss to date 52 - 26 - 23 - -   % Wt Loss 0.34 - 0.17 - 0.15 - -   80% .2 - 151.2 - 154.4 - -       Comorbidities:  Hypertension: not applicable  Diabetes: not applicable  Obstructive Sleep Apnea: resolved  Hyperlipidemia: not applicable  Stress Urinary Incontinence: resolved  Gastroesophageal Reflux: resolved  Weight related arthropathy:improved        Past Medical History:   Diagnosis Date    COVID-19 01/05/2022    no symptoms    Diabetes (HCC)     Pre DM    Obesity (BMI 35.0-39.9 without comorbidity)     PCOS (polycystic ovarian syndrome)     Plantar fasciitis        Past Surgical History:   Procedure Laterality Date    HX GI  01/31/2022    LGBP    HX GYN  2020    polypectomy uterus     HX TONSILLECTOMY         Current Outpatient Medications   Medication Sig Dispense Refill    psyllium seed, with sugar, (FIBER PO) Take  by mouth.  multivitamin with iron (FLINTSTONES) chewable tablet Take 1 Tablet by mouth daily.  calcium citrate 200 mg (950 mg) tablet Take  by mouth daily.  cyanocobalamin (Vitamin B-12) 100 mcg tablet Take 100 mcg by mouth daily.  thiamine HCL (Vitamin B-1) 100 mg tablet Take  by mouth daily.  cholecalciferol (Vitamin D3) (5000 Units/125 mcg) tab tablet Take 5,000 Units by mouth daily. Allergies   Allergen Reactions    Augmentin [Amoxicillin-Pot Clavulanate] Nausea and Vomiting       ROS:  Review of Systems   Constitutional: Positive for weight loss. Negative for malaise/fatigue. Eyes: Negative for blurred vision. Respiratory: Negative. Cardiovascular: Negative for chest pain and palpitations. Gastrointestinal: Negative. Genitourinary: Negative. Musculoskeletal: Negative. Skin: Positive for itching. Negative for rash. Neurological: Positive for tingling. Negative for dizziness, weakness and headaches. Physicial Exam:  Visit Vitals  /81 (BP 1 Location: Left upper arm, BP Patient Position: Sitting)   Pulse 71   Temp 97.6 °F (36.4 °C)   Ht 5' 8\" (1.727 m)   Wt 127 kg (280 lb)   SpO2 95%   BMI 42.57 kg/m²     Physical Exam  Vitals and nursing note reviewed. Constitutional:       Appearance: She is obese. HENT:      Head: Normocephalic and atraumatic. Cardiovascular:      Rate and Rhythm: Normal rate and regular rhythm. Heart sounds: Normal heart sounds. Pulmonary:      Effort: Pulmonary effort is normal.      Breath sounds: Normal breath sounds. Abdominal:      General: Bowel sounds are normal.      Palpations: Abdomen is soft. Musculoskeletal:         General: Normal range of motion. Skin:     General: Skin is warm and dry. Neurological:      Mental Status: She is alert and oriented to person, place, and time. Psychiatric:         Mood and Affect: Mood normal.         Behavior: Behavior normal.         Thought Content: Thought content normal.         Labs:   Recent Results (from the past 672 hour(s))   CBC W/O DIFF    Collection Time: 04/29/22  7:36 AM   Result Value Ref Range    WBC 4.3 (L) 4.6 - 13.2 K/uL    RBC 4.33 4.20 - 5.30 M/uL    HGB 13.1 12.0 - 16.0 g/dL    HCT 39.5 35.0 - 45.0 %    MCV 91.2 78.0 - 100.0 FL    MCH 30.3 24.0 - 34.0 PG    MCHC 33.2 31.0 - 37.0 g/dL    RDW 12.9 11.6 - 14.5 %    PLATELET 641 395 - 632 K/uL    MPV 11.7 9.2 - 11.8 FL    NRBC 0.0 0  WBC    ABSOLUTE NRBC 0.00 0.00 - 0.01 K/uL   FERRITIN    Collection Time: 04/29/22  7:36 AM   Result Value Ref Range    Ferritin 51 8 - 388 NG/ML   IRON    Collection Time: 04/29/22  7:36 AM   Result Value Ref Range    Iron 75 50 - 585 ug/dL   METABOLIC PANEL, COMPREHENSIVE    Collection Time: 04/29/22  7:36 AM   Result Value Ref Range    Sodium 141 136 - 145 mmol/L    Potassium 3.9 3.5 - 5.5 mmol/L    Chloride 109 100 - 111 mmol/L    CO2 25 21 - 32 mmol/L    Anion gap 7 3.0 - 18 mmol/L    Glucose 85 74 - 99 mg/dL    BUN 13 7.0 - 18 MG/DL    Creatinine 0.72 0.6 - 1.3 MG/DL    BUN/Creatinine ratio 18 12 - 20      GFR est AA >60 >60 ml/min/1.73m2    GFR est non-AA >60 >60 ml/min/1.73m2    Calcium 9.7 8.5 - 10.1 MG/DL    Bilirubin, total 0.6 0.2 - 1.0 MG/DL    ALT (SGPT) 41 13 - 56 U/L    AST (SGOT) 21 10 - 38 U/L    Alk.  phosphatase 66 45 - 117 U/L    Protein, total 7.2 6.4 - 8.2 g/dL    Albumin 4.2 3.4 - 5.0 g/dL    Globulin 3.0 2.0 - 4.0 g/dL    A-G Ratio 1.4 0.8 - 1.7     VITAMIN B12 & FOLATE    Collection Time: 04/29/22  7:36 AM   Result Value Ref Range    Vitamin B12 689 211 - 911 pg/mL    Folate >20.0 (H) 3.10 - 17.50 ng/mL   VITAMIN D, 25 HYDROXY    Collection Time: 04/29/22  7:36 AM   Result Value Ref Range Vitamin D 25-Hydroxy 64.9 30 - 100 ng/mL       Assessment/Plan:   She is currently 3 months s/p laparoscopic gastric bypass surgery with a total weight loss of 52lbs to date, doing well. Labs were reviewed and pt was instructed to continue multivitamin/B1/B12/calcium/vit D. Pt iron and Hgb within normal range, however, pt reports heavy menses and hx of anemia. May take iron supplement 3x weekly. Reassurance provided regarding hair loss. Ensure getting at least 60 g protein daily and taking all recommended bariatric supplementation. Can start biotin. Extensive discussion regarding high density carbohydrates and their impacts on weight regain, hunger, cravings and reactive hypoglycemia. We have reviewed the components of a successful postoperative course including requirement for a high protein, low carbohydrate diet, 64 oz a day of zero calorie liquids, daily vitamin supplementation, daily exercise (150 mis/week), regular follow-up, and participation in support groups. Refer to registered dietitian for more detailed medical nutrition therapy as needed. The primary encounter diagnosis was Post-resection malabsorption. Diagnoses of S/P gastric bypass, Morbid obesity ([address] and BMI 40.0-44.9, adult [facility]) were also pertinent to this visit. Follow-up and Dispositions    · Return in about 3 months (around 8/5/2022). with labs, sooner as needed.   STEPHAN Morris NP

## 2022-05-09 LAB — VIT B1 BLD-SCNC: 121 NMOL/L (ref 66.5–200)

## 2022-08-04 ENCOUNTER — HOSPITAL ENCOUNTER (OUTPATIENT)
Dept: LAB | Age: 37
Discharge: HOME | End: 2022-08-04
Payer: COMMERCIAL

## 2022-08-04 DIAGNOSIS — E66.01 MORBID OBESITY (HCC): ICD-10-CM

## 2022-08-04 DIAGNOSIS — K91.2 POST-RESECTION MALABSORPTION: ICD-10-CM

## 2022-08-04 DIAGNOSIS — Z98.84 S/P GASTRIC BYPASS: ICD-10-CM

## 2022-08-04 LAB
25(OH)D3 SERPL-MCNC: 67.9 NG/ML (ref 30–100)
ALBUMIN SERPL-MCNC: 4 G/DL (ref 3.4–5)
ALBUMIN/GLOB SERPL: 1.4 {RATIO} (ref 0.8–1.7)
ALP SERPL-CCNC: 67 U/L (ref 45–117)
ALT SERPL-CCNC: 20 U/L (ref 13–56)
ANION GAP SERPL CALC-SCNC: 4 MMOL/L (ref 3–18)
AST SERPL-CCNC: 12 U/L (ref 10–38)
BILIRUB SERPL-MCNC: 0.5 MG/DL (ref 0.2–1)
BUN SERPL-MCNC: 11 MG/DL (ref 7–18)
BUN/CREAT SERPL: 16 (ref 12–20)
CALCIUM SERPL-MCNC: 8.9 MG/DL (ref 8.5–10.1)
CHLORIDE SERPL-SCNC: 109 MMOL/L (ref 100–111)
CO2 SERPL-SCNC: 27 MMOL/L (ref 21–32)
CREAT SERPL-MCNC: 0.68 MG/DL (ref 0.6–1.3)
ERYTHROCYTE [DISTWIDTH] IN BLOOD BY AUTOMATED COUNT: 12.6 % (ref 11.6–14.5)
FERRITIN SERPL-MCNC: 51 NG/ML (ref 8–388)
GLOBULIN SER CALC-MCNC: 2.8 G/DL (ref 2–4)
GLUCOSE SERPL-MCNC: 91 MG/DL (ref 74–99)
HCT VFR BLD AUTO: 38.5 % (ref 35–45)
HGB BLD-MCNC: 12.7 G/DL (ref 12–16)
IRON SERPL-MCNC: 79 UG/DL (ref 50–175)
MCH RBC QN AUTO: 30.8 PG (ref 24–34)
MCHC RBC AUTO-ENTMCNC: 33 G/DL (ref 31–37)
MCV RBC AUTO: 93.2 FL (ref 78–100)
NRBC # BLD: 0 K/UL (ref 0–0.01)
NRBC BLD-RTO: 0 PER 100 WBC
PLATELET # BLD AUTO: 222 K/UL (ref 135–420)
PMV BLD AUTO: 12.1 FL (ref 9.2–11.8)
POTASSIUM SERPL-SCNC: 4.3 MMOL/L (ref 3.5–5.5)
PROT SERPL-MCNC: 6.8 G/DL (ref 6.4–8.2)
RBC # BLD AUTO: 4.13 M/UL (ref 4.2–5.3)
SODIUM SERPL-SCNC: 140 MMOL/L (ref 136–145)
VIT B12 SERPL-MCNC: 584 PG/ML (ref 211–911)
WBC # BLD AUTO: 5.2 K/UL (ref 4.6–13.2)

## 2022-08-04 PROCEDURE — 82728 ASSAY OF FERRITIN: CPT

## 2022-08-04 PROCEDURE — 84425 ASSAY OF VITAMIN B-1: CPT

## 2022-08-04 PROCEDURE — 36415 COLL VENOUS BLD VENIPUNCTURE: CPT

## 2022-08-04 PROCEDURE — 82306 VITAMIN D 25 HYDROXY: CPT

## 2022-08-04 PROCEDURE — 80053 COMPREHEN METABOLIC PANEL: CPT

## 2022-08-04 PROCEDURE — 83540 ASSAY OF IRON: CPT

## 2022-08-04 PROCEDURE — 85027 COMPLETE CBC AUTOMATED: CPT

## 2022-08-04 PROCEDURE — 82607 VITAMIN B-12: CPT

## 2022-08-08 LAB — VIT B1 BLD-SCNC: 118.8 NMOL/L (ref 66.5–200)

## 2022-08-11 ENCOUNTER — OFFICE VISIT (OUTPATIENT)
Dept: SURGERY | Age: 37
End: 2022-08-11
Payer: COMMERCIAL

## 2022-08-11 VITALS
OXYGEN SATURATION: 98 % | HEIGHT: 68 IN | DIASTOLIC BLOOD PRESSURE: 71 MMHG | TEMPERATURE: 98 F | SYSTOLIC BLOOD PRESSURE: 105 MMHG | BODY MASS INDEX: 37.74 KG/M2 | HEART RATE: 65 BPM | WEIGHT: 249 LBS

## 2022-08-11 DIAGNOSIS — K91.2 POST-RESECTION MALABSORPTION: Primary | ICD-10-CM

## 2022-08-11 DIAGNOSIS — Z98.84 S/P GASTRIC BYPASS: ICD-10-CM

## 2022-08-11 DIAGNOSIS — E66.9 OBESITY (BMI 30-39.9): ICD-10-CM

## 2022-08-11 PROCEDURE — 99212 OFFICE O/P EST SF 10 MIN: CPT | Performed by: NURSE PRACTITIONER

## 2022-08-11 NOTE — PROGRESS NOTES
Bariatric Postoperative Progress Note    CC: 6 Month LGBP Follow Up    Janiya Neri is a 29 y.o. female now 6 months status post laparoscopic gastric bypass surgery performed on 1/31/22. Doing well overall. Currently eating eggs, chicken, turkey, beef, cheese, yogurt, broccoli, peas, cauliflower, blueberries, strawberries, occ potatoes. Taking in 80 oz water,  60 g protein. 30 min of walking 3-5 days a week. Bowel movements are regular. She is compliant with multivitamins, calcium, Vit D and B12 supplements. Still experiencing some numbness/tingling to left lateral thigh, slowly improving. Denies any NSAID, ETOH, or tobacco use. Weight Loss Metrics 8/11/2022 8/11/2022 5/5/2022 5/5/2022 3/3/2022 3/3/2022 2/17/2022   Pre op / Initial Wt 332 - 332 - 332 - 336   Today's Wt - 249 lb - 280 lb - 306 lb -   BMI - 37.86 kg/m2 - 42.57 kg/m2 - 46.53 kg/m2 -   Ideal Body Wt 143 - 143 - 143 - 143   Excess Body Wt 189 - 189 - 189 - 193   Goal Wt 181 - 181 - 181 - -   Wt loss to date 83 - 52 - 26 - 23   % Wt Loss 0.55 - 0.34 - 0.17 - 0.15   80% .2 - 151.2 - 151.2 - 154.4     Comorbidities:  Hypertension: not applicable  Diabetes: not applicable  Obstructive Sleep Apnea: resolved  Hyperlipidemia: not applicable  Stress Urinary Incontinence: resolved  Gastroesophageal Reflux: resolved  Weight related arthropathy:improved      Past Medical History:   Diagnosis Date    COVID-19 01/05/2022    no symptoms    Diabetes (HCC)     Pre DM    Obesity (BMI 35.0-39.9 without comorbidity)     PCOS (polycystic ovarian syndrome)     Plantar fasciitis        Past Surgical History:   Procedure Laterality Date    HX GI  01/31/2022    LGBP    HX GYN  2020    polypectomy uterus     HX TONSILLECTOMY         Current Outpatient Medications   Medication Sig Dispense Refill    multivitamin with iron (FLINTSTONES) chewable tablet Take 1 Tablet by mouth daily. calcium citrate 200 mg (950 mg) tablet Take  by mouth daily. cyanocobalamin (VITAMIN B12) 100 mcg tablet Take 100 mcg by mouth daily. thiamine HCL (B-1) 100 mg tablet Take  by mouth daily. cholecalciferol (VITAMIN D3) (5000 Units/125 mcg) tab tablet Take 5,000 Units by mouth daily. psyllium seed, with sugar, (FIBER PO) Take  by mouth. (Patient not taking: Reported on 8/11/2022)         Allergies   Allergen Reactions    Augmentin [Amoxicillin-Pot Clavulanate] Nausea and Vomiting       ROS:  Review of Systems   Constitutional:  Positive for weight loss. Negative for malaise/fatigue. Eyes: Negative. Respiratory: Negative. Cardiovascular:  Negative for chest pain and palpitations. Gastrointestinal:  Negative for abdominal pain, blood in stool, constipation, diarrhea, heartburn, melena, nausea and vomiting. Genitourinary: Negative. Skin:  Negative for itching and rash. Neurological:  Positive for tingling (left lateral thigh). Negative for dizziness, loss of consciousness, weakness and headaches. Physicial Exam:  Visit Vitals  /71 (BP 1 Location: Right arm, BP Patient Position: Sitting)   Pulse 65   Temp 98 °F (36.7 °C)   Ht 5' 8\" (1.727 m)   Wt 112.9 kg (249 lb)   SpO2 98%   BMI 37.86 kg/m²     Physical Exam  Vitals and nursing note reviewed. Constitutional:       Appearance: She is obese. HENT:      Head: Normocephalic and atraumatic. Cardiovascular:      Rate and Rhythm: Normal rate. Heart sounds: Normal heart sounds. Pulmonary:      Effort: Pulmonary effort is normal.      Breath sounds: Normal breath sounds. Abdominal:      General: Bowel sounds are normal. There is no distension. Palpations: Abdomen is soft. There is no mass. Tenderness: There is no abdominal tenderness. Hernia: No hernia is present. Musculoskeletal:         General: Normal range of motion. Skin:     General: Skin is warm and dry. Neurological:      General: No focal deficit present.       Mental Status: She is alert and oriented to person, place, and time. Psychiatric:         Mood and Affect: Mood normal.         Behavior: Behavior normal.       Labs:   Recent Results (from the past 672 hour(s))   CBC W/O DIFF    Collection Time: 08/04/22  7:26 AM   Result Value Ref Range    WBC 5.2 4.6 - 13.2 K/uL    RBC 4.13 (L) 4.20 - 5.30 M/uL    HGB 12.7 12.0 - 16.0 g/dL    HCT 38.5 35.0 - 45.0 %    MCV 93.2 78.0 - 100.0 FL    MCH 30.8 24.0 - 34.0 PG    MCHC 33.0 31.0 - 37.0 g/dL    RDW 12.6 11.6 - 14.5 %    PLATELET 084 608 - 192 K/uL    MPV 12.1 (H) 9.2 - 11.8 FL    NRBC 0.0 0  WBC    ABSOLUTE NRBC 0.00 0.00 - 0.01 K/uL   FERRITIN    Collection Time: 08/04/22  7:26 AM   Result Value Ref Range    Ferritin 51 8 - 388 NG/ML   IRON    Collection Time: 08/04/22  7:26 AM   Result Value Ref Range    Iron 79 50 - 170 ug/dL   METABOLIC PANEL, COMPREHENSIVE    Collection Time: 08/04/22  7:26 AM   Result Value Ref Range    Sodium 140 136 - 145 mmol/L    Potassium 4.3 3.5 - 5.5 mmol/L    Chloride 109 100 - 111 mmol/L    CO2 27 21 - 32 mmol/L    Anion gap 4 3.0 - 18 mmol/L    Glucose 91 74 - 99 mg/dL    BUN 11 7.0 - 18 MG/DL    Creatinine 0.68 0.6 - 1.3 MG/DL    BUN/Creatinine ratio 16 12 - 20      GFR est AA >60 >60 ml/min/1.73m2    GFR est non-AA >60 >60 ml/min/1.73m2    Calcium 8.9 8.5 - 10.1 MG/DL    Bilirubin, total 0.5 0.2 - 1.0 MG/DL    ALT (SGPT) 20 13 - 56 U/L    AST (SGOT) 12 10 - 38 U/L    Alk.  phosphatase 67 45 - 117 U/L    Protein, total 6.8 6.4 - 8.2 g/dL    Albumin 4.0 3.4 - 5.0 g/dL    Globulin 2.8 2.0 - 4.0 g/dL    A-G Ratio 1.4 0.8 - 1.7     VITAMIN B1, WHOLE BLOOD    Collection Time: 08/04/22  7:26 AM   Result Value Ref Range    Vitamin B1 118.8 66.5 - 200.0 nmol/L   VITAMIN B12    Collection Time: 08/04/22  7:26 AM   Result Value Ref Range    Vitamin B12 584 211 - 911 pg/mL   VITAMIN D, 25 HYDROXY    Collection Time: 08/04/22  7:26 AM   Result Value Ref Range    Vitamin D 25-Hydroxy 67.9 30 - 100 ng/mL       Assessment/Plan:   She is currently 6 months s/p laparoscopic gastric bypass surgery with a total weight loss of 83 lbs to date, doing well. Labs were reviewed and pt was instructed to continue MVI/B1/B12/D supplementation. We have reviewed the components of a successful postoperative course including requirement for a high protein, low carbohydrate diet, 64 oz a day of zero calorie liquids, daily vitamin supplementation, daily exercise (150 mis/week), regular follow-up, and participation in support groups. Refer to registered dietitian for more detailed medical nutrition therapy as needed. The primary encounter diagnosis was Post-resection malabsorption. Diagnoses of S/P gastric bypass, Obesity (BMI 30-39.9), and BMI 37.0-37.9, adult were also pertinent to this visit. Follow-up and Dispositions    Return in about 6 months (around 2/11/2023). with labs, sooner as needed.   Hiwot Mckeon NP

## 2022-09-20 ENCOUNTER — DOCUMENTATION ONLY (OUTPATIENT)
Dept: BARIATRICS/WEIGHT MGMT | Age: 37
End: 2022-09-20

## 2022-09-20 NOTE — PROGRESS NOTES
9/20/22:  Patient did not show for her 9 month post op visit. She was contacted to be rescheduled.     Trip Santillan MS RD

## 2022-12-07 ENCOUNTER — DOCUMENTATION ONLY (OUTPATIENT)
Dept: SURGERY | Age: 37
End: 2022-12-07

## 2022-12-07 NOTE — PROGRESS NOTES
Per [facility] requirements;  E-mail and letter sent for follow up appointment. [employer] [address] 89      Dear Patient,    Your health is our main concern. It is important for your health to have follow-up lab work and to see your surgeon at 3 months, 6 months and annually after your weight loss surgery. Additionally, the Department of bariatric Surgery at our hospital is a member of the Metabolic and Bariatric Surgery Accreditation and Quality Improvement Program [facility]). As a participant in this program, we gather information on the outcomes of our patients after surgery. Please call the office for a follow up appointment at [phone] [facility]) or [phone] [facility]). If you have moved out of the area or have changed surgeons please call us and let us know the name of your doctor. Your health and feedback are important to us. We greatly appreciate your response.        Thank you,  [employer] [address] [facility]

## (undated) DEVICE — SOLUTION IV 1000ML 0.9% SOD CHL

## (undated) DEVICE — TROCAR ENDOSCP SHFT L100MM DIA12MM INTEGR STBL ENDOPATH

## (undated) DEVICE — TROCAR ENDOSCP BLDELSS 12X100 MM W/ HNDL STBL SL OPT TIP

## (undated) DEVICE — 4-PORT MANIFOLD: Brand: NEPTUNE 2

## (undated) DEVICE — SCISSORS ENDOSCP DIA5MM CRV MPLR CAUT W/ RATCH HNDL

## (undated) DEVICE — SET SUCT IRR TIP DISP STRYKEFLOW2

## (undated) DEVICE — ICE BAG INSERTS

## (undated) DEVICE — INTENDED FOR TISSUE SEPARATION, AND OTHER PROCEDURES THAT REQUIRE A SHARP SURGICAL BLADE TO PUNCTURE OR CUT.: Brand: BARD-PARKER SAFETY BLADES SIZE 11, STERILE

## (undated) DEVICE — RELOAD STPL L60MM H1.5-3.6MM REG TISS BLU GRIPPING SURF B

## (undated) DEVICE — 3M™ STERI-STRIP™ COMPOUND BENZOIN TINCTURE 40 BAGS/CARTON 4 CARTONS/CASE C1544: Brand: 3M™ STERI-STRIP™

## (undated) DEVICE — SUTURE VCRL SZ 2-0 L54IN ABSRB VLT W/O NDL POLYGLACTIN 910 J618H

## (undated) DEVICE — GARMENT,MEDLINE,DVT,INT,CALF,MED, GEN2: Brand: MEDLINE

## (undated) DEVICE — HANDLE PRB DIA5MM HND CTRL PSTL GRP ENDOPATH PRB + II

## (undated) DEVICE — BLANKET WRM W29.9XL79.1IN UP BODY FORC AIR MISTRAL-AIR

## (undated) DEVICE — PACK PROCEDURE SURG LAPAROSCOPY 17X7 MM BRTRC PRIMUS

## (undated) DEVICE — TISSUE RETRIEVAL SYSTEM: Brand: INZII RETRIEVAL SYSTEM

## (undated) DEVICE — BAG DRNGE C650ML H SZ 5-38 MAMM TISS EXP CNTOUR PROF NACL

## (undated) DEVICE — TRUE CONTENT TO BE POPULATED AS PART OF REBRANDING: Brand: ARGYLE

## (undated) DEVICE — REM POLYHESIVE ADULT PATIENT RETURN ELECTRODE: Brand: VALLEYLAB

## (undated) DEVICE — TAPE ADH W3INXL10YD PLAS TRNSPAR H2O RESIST HYPOALRG CURAD

## (undated) DEVICE — STRIP,CLOSURE,WOUND,MEDI-STRIP,1/2X4: Brand: MEDLINE

## (undated) DEVICE — RELOAD STPL L60MM H1-2.6MM MESENTERY THN TISS WHT 6 ROW

## (undated) DEVICE — GLOVE SURG SZ 7 L11.33IN FNGR THK9.8MIL STRW LTX POLYMER

## (undated) DEVICE — BLANKET WRM AD W50XL85.8IN PACU FULL BODY FORC AIR

## (undated) DEVICE — TROCAR ENDOSCP L100MM DIA12MM STBL SL BLDELSS ENDOPATH XCEL

## (undated) DEVICE — SUTURE VCRL SZ 3-0 L27IN ABSRB VLT L26MM SH 1/2 CIR J316H

## (undated) DEVICE — SOLUTION IRRIG 1000ML H2O STRL BLT

## (undated) DEVICE — COVER,LIGHT HANDLE,FLX,1/PK: Brand: MEDLINE INDUSTRIES, INC.

## (undated) DEVICE — SOFT SILICONE HYDROCELLULAR SACRUM DRESSING WITH LOCK AWAY LAYER: Brand: ALLEVYN LIFE SACRUM (LARGE) PACK OF 10

## (undated) DEVICE — TROCAR LAP L100MM DIA5MM BLDELSS W/ STBL SL ENDOPATH XCEL

## (undated) DEVICE — STAPLE INT WHT BLU G GRN BLK REINF FOR ENDOPATH ECHELON FLX

## (undated) DEVICE — GAUZE SPONGES,8 PLY: Brand: CURITY

## (undated) DEVICE — SUT SLK 2-0SH 30IN BLK --

## (undated) DEVICE — SHEAR HARMONIC ACET 5MMX36CM -- ACE PLUS

## (undated) DEVICE — SHEARS ENDOSCP HARM 36CM ULTRASONIC CRV TIP UPGRD

## (undated) DEVICE — GARMENT,MEDLINE,DVT,INT,CALF,LG, GEN2: Brand: MEDLINE

## (undated) DEVICE — STAPLER SKIN LN REINF 60 MM ECHELON ENDOPATH

## (undated) DEVICE — SUTURE MCRYL SZ 4-0 L27IN ABSRB UD L24MM PS-1 3/8 CIR PRIM Y935H

## (undated) DEVICE — STAPLER SKIN L440MM 32MM LNG 12 FIRING B FRM PWR + GRIPPING